# Patient Record
Sex: MALE | Race: WHITE
[De-identification: names, ages, dates, MRNs, and addresses within clinical notes are randomized per-mention and may not be internally consistent; named-entity substitution may affect disease eponyms.]

---

## 2017-01-26 NOTE — RAD
PROCEDURE 

Three-view right shoulder radiographs 01/26/2017 

 

HISTORY 

Severe right shoulder pain. History of recent right shoulder fracture. 

 

FINDINGS 

AP internal and external rotation and transscapular digital radiographs 

the right shoulder were obtained. Comparison study is dated 12/24/2016. 

A comminuted fracture is again seen involving the right humeral neck which

extends to involve the greater tuberosity. The alignment of the fracture 

fragments have not significantly changed. Since the previous examination 

there has been evidence of callus formation consistent with interval 

healing. No acute fracture is seen. 

 

IMPRESSION 

Healing fracture of the proximal right humerus as outlined above. No acute

fracture is seen. 

 

Electronically signed by: Omar Goins MD (Jan 26, 2017 22:54:57)
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
## 2017-03-03 NOTE — ACF
Admit Criteria Forms


                                                                         


                           ABDOMINAL PAIN





Clinical Indications for Admission to Inpatient Care


 


                                                                               

      (Place 'X' for any and all applicable criteria):





Admission is indicated for ANY ONE of the following(1)(2)(3)(4)(5):


[X]I.      Inpatient admission required rather than observation care (Also use 

Abdominal Pain: Observation Care, 


           as appropriate) because of ANY ONE of the following:


            [X]a)   Severe pain requiring acute inpatient management


            [ ]b)    Identification of etiology/finding that requires inpatient 

care (eg, aortic dissection, free air)


            [ ]c)    Absent bowel sounds with complete ileus(6)  


            [ ]d)    Suspected toxic megacolon


            [ ]e)    Severe electrolyte abnormalities requiring inpatient care


            [ ]f)     High fever or infection requiring inpatient admission as 

indicated by ANY ONE of following(7)(8):


                       [ ] i)    Appropriate outpatient or observational care 

antimicrobial treatment unavailable, not effective, or not feasible


                       [ ] ii)   Documented bacteremia 


                       [ ] iii)  Temperature > 104.9 degrees F (oral)


                       [ ] iv)  T >103.1 F (oral) or < 96.8 F(rectal) that does 

not respond to all emergency treatment measures


            [ ]g)     Signs of intestinal obstruction [B] 


            [ ]h)     Hemodynamic instability


            [ ]i)      IV fluid to replace significant ongoing losses (greater 

than 3 L/m2 per day) (12)(13)


            [ ]j)      Percutaneous or open drainage (eg, abscess, biliary tract

) procedures


            [ ]k)     Parenteral nutrition regimen that must be implemented on 

inpatient basis   


            [ ]l)      Other condition,treatment or monitoring requiring 

inpatient admission.


[ ]II.      Peritoneal signs present


[ ]III.     Surgery needed that cannot be performed on an ambulatory basis.


[ ]IV.    Evaluation requires patient to not eat or drink for extended period (

eg, more than 24 hours).





[ ]V.     Contraindications and/or Inappropriate clinical situations for 

Observational Care in patients with


           abdominal pain, when ANY ONE of the following is required:


           [ ]a)  Thorough evaluation is required to prevent catastrophic 

events due to delays in diagnosing  


                   (e.g.Mesenteric ischemia) 1,3


           [ ]b)  Patient with severe pathology or with chronic symptoms 

unlikely to improve in the ED stay (3)


[ ]VI.    General contraindications and/or Inappropriate clinical situations 

for Observational Care in patients


           with abdominal pain, when ANY ONE of the following is required:


           [ ]a)   Prediction of prolongation of LOS based on ANY ONE of the 

following may be considered as 


                    a contraindication for observational care 2, 3, 4, 5, 6, 7, 

8, 9, 10, 11


                    [ ]i)    Age > 65 yrs.


                    [ ]ii)   Patient arriving by ambulance


                    [ ]iii)  Patient with high acuity


                    [ ]iv)  Patient requiring vital sign monitoring


                    [ ]v)   Patient on IV medication


           [ ]b)   Systolic blood pressures  180mmHg 3,12


           [ ]c)   Patient with altered mental status including delirium and 

other alteration of consciousness, (3)


           [ ]d)   Patient whose discharge disposition will be to a skilled 

nursing home or rehabilitation home should 


                    not be managed in Emergency Department Observation Unit. 

CMS rule requires 3 days hospital stay before such placement.3,13


           [ ]e)   Patient with failure to thrive due to broad array of 

etiologies 3,16,17


           [ ]f)    Inability to ambulate 3,14








Extended stay beyond goal length of stay may be needed for(2)(3):


[ ]a)   Persistent abdominal pain with suspected intra-abdominal process


[ ]b)   Diagnosed condition requiring continued stay (e.g., pancreatitis, 

complicated diverticulitis)                                            


[ ]c)   Surgery (e.g., colectomy)                


    





The original [employer] content created by [employer] has been revised. 


The portions of the content which have been revised are identified through the 

use of italic text or in bold, and [facility] 


has neither reviewed nor approved the modified material.All other unmodified 

content is copyright  [employer].





Please see references footnoted in the original [facility] edition 

2016








BERRY MCNEAL Mar 3, 2017 23:34

## 2017-03-03 NOTE — RAD
PROCEDURE 

CT scan abdomen and pelvis with contrast 03/03/2017 

 

HISTORY 

Right flank pain. History of Crohn's disease. 

 

TECHNIQUE 

After the intravenous administration off 75 cc of Omnipaque 300, 

contiguous, 5 millimeter axial sections were obtained through the abdomen 

and pelvis. 

One or more of the following individualized dose reduction techniques were

utilized for this study: 

1. Automated exposure control. 

2. Adjustment of the mA and/or kV according to patient size. 

3. Use of iterative reconstruction technique. 

 

 

FINDINGS 

Comparison study is dated 01/03/2017. 

The absence of oral contrast material limits this study for the detection 

of bowel pathology. 

Images through the lung bases demonstrate minimal dependent subsegmental 

atelectasis bilaterally. 

The liver, spleen, pancreas, adrenal glands and kidneys are within normal 

limits. 

The abdominal aorta tapers normally. The gallbladder is slightly 

contracted. No free fluid or free air is seen within the abdomen. There is

no evidence of bowel obstruction. Air and stool seen throughout the colon.

The terminal ileum is mildly distended and filled with stool. No wall 

thickening of the terminal ileum is noted. No inflammatory changes are 

seen in the adjacent fat. The appendix is well-visualized is within normal

limits. 

Images through the pelvis demonstrate the urinary bladder distended with 

urine. No free fluid is seen. The osseous structures are unchanged. 

 

IMPRESSION 

No acute abnormality is seen. 

 

Electronically signed by: Omar Goisn MD (Mar 03, 2017 21:36:28)

## 2017-03-03 NOTE — PHYS DOC
Past Medical History


Past Medical History:  Diabetes-Type II, Diverticulosis, Pancreatitis


Additional Past Medical Histor:  BI-POLAR, CHRONES DISEASE, ADHD


Past Surgical History:  No Surgical History


Additional Past Surgical Histo:  Colonoscopy, EGD


Additional Information:  


CHEWS TOBACCO


Alcohol Use:  Occasionally


Drug Use:  Marijuana, Methamphetamine





Adult General


Chief Complaint


Chief Complaint:  HYPERGLYCEMIA





HPI


HPI


Patient is a 36  year old female who presents with right flank and abdominal 

pain, nausea and vomiting, hyperglycemia. Patient reports for the past day and 

a half he has been having sharp pain in his right flank and right lower 

quadrant. This is accompanied by nausea and vomiting. He also reports his blood 

sugars been running in the 400s and he has been having polyuria. He has been 

taking his insulin as he is supposed to. No clear inciting or mitigating 

factors. No other acute complaints.





Review of Systems


Review of Systems


Constitutional: Denies fever or chills 


Eyes: Denies change in visual acuity or eye pain 


HENT: Denies nasal congestion or sore throat 


Respiratory: Denies cough or shortness of breath 


Cardiovascular: Denies chest pain


GI: RLQ abdominal pain, nausea, vomiting. Denies bloody stools or diarrhea 


: Polyuria. Denies dysuria or hematuria 


Musculoskeletal: R flank pain. Denies back pain or joint pain 


Integument: Denies rash or skin lesions 


Neurologic: Denies headache, focal weakness or sensory changes





Current Medications


Current Medications





 Current Medications








 Medications


  (Trade)  Dose


 Ordered  Sig/Eden  Start Time


 Stop Time Status Last Admin


Dose Admin


 


 Info


  (Do NOT chart on


 this entry -- for


 MONITORING)  1 each  PRN DAILY  PRN  3/3/17 20:45


 3/5/17 20:44   


 


 


 Iohexol


  (Omnipaque 300


 Mg/ml)  75 ml  1X  ONCE  3/3/17 20:30


 3/3/17 20:31 DC 3/3/17 21:21


75 ML


 


 Morphine Sulfate


 4 mg  4 mg  1X  ONCE  3/3/17 21:45


 3/3/17 21:46 DC 3/3/17 21:59


4 MG


 


 Promethazine HCl/


 Sodium Chloride


  (Phenergan/Iv


 Sodium Chloride


 0.9% 50ml)  50.5 ml @ 


 151.5 mls/


 hr  1X  ONCE  3/3/17 20:15


 3/3/17 20:34 DC 3/3/17 20:33


151.5 MLS/HR


 


 Sodium Chloride


  (Iv Sodium


 Chloride 0.9%


 1000ml Bag)  1,000 ml @ 


 75 mls/hr  [id number]  3/3/17 22:42


 3/4/17 22:41   


 











Allergies


Allergies





 Allergies








Coded Allergies Type Severity Reaction Last Updated Verified


 


  fentanyl Allergy Severe Throat Swells--Anaphylaxis 14 Yes


 


  haloperidol Allergy Severe "In ICU, almost .Toxicity." 14 Yes


 


  lithium Allergy Severe "In ICU-almost . Toxicity" 14 Yes


 


  ketorolac Allergy Intermediate Hives 14 Yes











Physical Exam


Physical Exam


Constitutional: Well developed, well nourished, no acute distress, non-toxic 

appearance 


HENT: Normocephalic, atraumatic, bilateral external ears normal


Eyes: EOMI, conjunctiva normal, no discharge


Neck: Normal range of motion, no stridor


Cardiovascular: Tachycardic, regular rhythm, no murmur 


Lungs & Thorax:  Bilateral breath sounds clear to auscultation


Abdomen: Bowel sounds normal, soft, non-distended, RLQ TTP without guarding or 

rebound


Skin: Warm, dry, no erythema, no rash


Back: R CVA tenderness


Extremities: No obvious deformity, no edema


Neurologic: Alert and oriented X 3, no gross deficits noted


Psychologic: Affect normal, judgement normal, mood normal





Current Patient Data


Vital Signs





 Vital Signs








  Date Time  Temp Pulse Resp B/P Pulse Ox O2 Delivery O2 Flow Rate FiO2


 


3/3/17 19:50 97.6 89 16 130/73 99 Room Air  





 97.6       








Lab Values





 Laboratory Tests








Test


  3/3/17


19:57 3/3/17


20:40


 


Glucose (Fingerstick)


  368mg/dL


(70-99)  H 


 


 


White Blood Count


  


  7.3x10^3/uL


(4.0-11.0)


 


Red Blood Count


  


  4.25x10^6/uL


(4.30-5.70)  L


 


Hemoglobin


  


  11.3g/dL


(13.0-17.5)  L


 


Hematocrit


  


  34.6%


(39.0-53.0)  L


 


Mean Corpuscular Volume  81fL ()  


 


Mean Corpuscular Hemoglobin  27pg (25-35)  


 


Mean Corpuscular Hemoglobin


Concent 


  33g/dL (31-37)


 


 


Red Cell Distribution Width


  


  16.0%


(11.5-14.5)  H


 


Platelet Count


  


  264x10^3/uL


(140-400)


 


Neutrophils (%) (Auto)  72% (31-73)  


 


Lymphocytes (%) (Auto)  20% (24-48)  L


 


Monocytes (%) (Auto)  7% (0-9)  


 


Eosinophils (%) (Auto)  1% (0-3)  


 


Basophils (%) (Auto)  0% (0-3)  


 


Neutrophils # (Auto)


  


  5.2x10^3uL


(1.8-7.7)


 


Lymphocytes # (Auto)


  


  1.5x10^3/uL


(1.0-4.8)


 


Monocytes # (Auto)


  


  0.5x10^3/uL


(0.0-1.1)


 


Eosinophils # (Auto)


  


  0.1x10^3/uL


(0.0-0.7)


 


Basophils # (Auto)


  


  0.0x10^3/uL


(0.0-0.2)


 


Urine Color  Yellow  


 


Urine Clarity  Cloudy  


 


Urine pH  6.5  


 


Urine Specific Gravity  >=1.030  


 


Urine Protein


  


  Negativemg/dL


(NEG-TRACE)


 


Urine Glucose (UA)


  


  >=1000mg/dL


(NEG)


 


Urine Ketones (Stick)


  


  Negativemg/dL


(NEG)


 


Urine Blood


  


  Negative (NEG)


 


 


Urine Nitrite


  


  Negative (NEG)


 


 


Urine Bilirubin


  


  Negative (NEG)


 


 


Urine Urobilinogen Dipstick


  


  0.2mg/dL (0.2


mg/dL)


 


Urine Leukocyte Esterase


  


  Negative (NEG)


 


 


Urine RBC  0/HPF (0-2)  


 


Urine WBC  0/HPF (0-4)  


 


Urine Squamous Epithelial


Cells 


  Occ/LPF  


 


 


Urine Bacteria  0/HPF (0-FEW)  


 


Sodium Level


  


  140mmol/L


(136-145)


 


Potassium Level


  


  4.0mmol/L


(3.5-5.1)


 


Chloride Level


  


  101mmol/L


()


 


Carbon Dioxide Level


  


  26mmol/L


(21-32)


 


Anion Gap  13 (6-14)  


 


Blood Urea Nitrogen


  


  12mg/dL (8-26)


 


 


Creatinine


  


  0.8mg/dL


(0.7-1.3)


 


Estimated GFR


(Cockcroft-Gault) 


  110.0  


 


 


BUN/Creatinine Ratio  15 (6-20)  


 


Glucose Level


  


  356mg/dL


(70-99)  H


 


Calcium Level


  


  9.0mg/dL


(8.5-10.1)


 


Total Bilirubin


  


  0.2mg/dL


(0.2-1.0)


 


Aspartate Amino Transferase


(AST) 


  14U/L (15-37)


L


 


Alanine Aminotransferase (ALT)  24U/L (16-63)  


 


Alkaline Phosphatase


  


  137U/L


()  H


 


Total Protein


  


  7.3g/dL


(6.4-8.2)


 


Albumin


  


  3.5g/dL


(3.4-5.0)


 


Albumin/Globulin Ratio


  


  0.9 (1.0-1.7)


L


 


Lipase


  


  123U/L


()





 Laboratory Tests


3/3/17 20:40








 Laboratory Tests


3/3/17 20:40














EKG


EKG


[]





Radiology/Procedures


Radiology/Procedures


CT A/P:


IMPRESSION 


No acute abnormality is seen.





Course & Med Decision Making


Course & Med Decision Making


Pertinent Labs and Imaging studies reviewed. (See chart for details)


Patient is 36-year-old male who presents with right flank and right lower 

quadrant pain, nausea and vomiting, hyperglycemia. We'll check CT abdomen/

pelvis to evaluate for possible causes such as kidney stone or appendicitis. 

Also check labs, UA. IV fluids, pain medication, nausea medication ordered for 

relief of symptoms. Blood work notable only for hyperglycemia. Imaging results 

as above. Discussed results with patient, who remains markedly tachycardic (in 

the 130s) even after fluids. Discussed with Dr. Borjas, will admit under his 

care for further evaluation and treatment.





Dragon Disclaimer


Dragon Disclaimer


This electronic medical record was generated, in whole or in part, using a 

voice recognition dictation system.





Departure


Departure


Impression:  


 Primary Impression:  


 Abdominal pain


 Additional Impression:  


 Tachycardia


Disposition:   ADMITTED AS INPATIENT


Admitting Physician:  Suni Borjas


Condition:  STABLE


Referrals:  


NO PCP (PCP)





Problem Qualifiers








ALDAIR JOHNSON MD Mar 3, 2017 20:12

## 2017-03-04 NOTE — SSS
ADMIT DATE:  03/03/2017



CHIEF COMPLAINT:  Hyperglycemia.



HISTORY OF PRESENT ILLNESS:  The patient is a pleasant 36-year-old male who

presents to the ER with right flank pain and elevated glucose.  While in the ER,

he was noted to be tachycardic at 114 beats per minute.  I discussed the case

with the ER physician.  He also has sugars in the 400s.  We are going to admit

him to get his sugars under control.



PAST MEDICAL HISTORY:  Narcotic dependence, diabetes, diverticulosis,

pancreatitis, bipolar, Crohn's, ADHD, colonoscopy, EGD, chewing tobacco usage,

marijuana use, methamphetamine use.



ALLERGIES:  FENTANYL, HALOPERIDOL, TORADOL AND LITHIUM.



FAMILY HISTORY:  Coronary artery disease.



SOCIAL HISTORY:  He does not drink or smoke.  I think he does use some drugs.



MEDICATIONS:  Reviewed, please refer to the MRAD.



REVIEW OF SYSTEMS:  

GENERAL:  No history of weight change, weakness or fevers.

SKIN:  No bruising, hair changes or rashes.

EYES:  No blurred, double or loss of vision.

NOSE AND THROAT:  No history of nosebleeds, hoarseness or sore throat.

HEART:  No history of palpitations, chest pain or shortness of breath on

exertion.

LUNGS:  Denies cough, hemoptysis, wheezing or shortness of breath.

GASTROINTESTINAL:  Denies changes in appetite, nausea, vomiting, diarrhea or

constipation.

GENITOURINARY:  No history of frequency, urgency, hesitancy or nocturia.

NEUROLOGIC:  Denies history of numbness, tingling, tremor or weakness.

PSYCHIATRIC:  No history of panic, anxiety or depression.

ENDOCRINE:  No history of heat or cold intolerance, polyuria or polydipsia.

EXTREMITIES:  Denies muscle weakness, joint pain, pain on walking or stiffness.



PHYSICAL EXAMINATION:

VITAL SIGNS:  Temperature afebrile, pulse is ranging from 118-120, respirations

18, blood pressure 149/102.

GENERAL:  He is alert, cooperative, pleasant.

HEART:  Normal S1, S2, tachycardic.

LUNGS:  Clear.

ABDOMEN:  Soft.

EXTREMITIES:  No edema.

SKIN:  No rashes.

PSYCHIATRIC:  He is stable.

VASCULAR:  Good capillary refill.

ENDOCRINE:  No thyromegaly.

LYMPHATICS:  No cervical nodes.

HEMATOPOIETIC:  No bruising.



LABORATORY DATA:  White count 7, hemoglobin 11, platelets 264.  Electrolytes

normal other than a glucose of 356.  This morning it is down to 200.  AST a

little low at 14, alkaline phosphatase a little high at 137.



ASSESSMENT AND PLAN:  Hyperglycemia tachycardia, elevated alkaline phosphate and

anemia in a middle-aged male who has the above-noted comorbidities.  The patient

is on cardiac monitoring.  We will consult GI regarding his abdominal pain. 

Consult Cardiology regarding the tachycardia.  Check a urine drug screen.  PT,

OT, home meds, frequent Accu-Cheks, sliding scale insulin.

 



______________________________

RACHAEL CHAVIRA DO



DR:  [email or web address]/[email or web address]  JOB#:  [id number] / [id number]

DD:  03/04/2017 11:16  DT:  03/04/2017 12:22

## 2017-03-05 NOTE — PDOC2
CONSULT


Date of Consult


Date of Consult


DATE: 3/5/17 


TIME: 08:32





Reason for Consult


Reason for Consult:


tachycardia





Referring Physician


Referring Physician:


Dr. Borjas





Identification/Chief Complaint


Chief Complaint


Abdominal pain





Source


Source:  Patient





History of Present Illness


Reason for Visit:


The patient is a 36-year-old male admitted through the emergency room with 

episodes of abdominal discomfort and elevated glucose. He has a history of 

diverticulitis, pancreatitis and Crohn's disease. Initial glucose levels were 

in the 200 range. He has been treated with medical patient's and pain control 

and is feeling significantly better this morning. His initial rate on admission 

was approximately 134 and a sinus tachycardia by available telemetry strips. 

This morning he is running in a sinus tachycardia rate of 100. There is no 

documented history of coronary disease, congestive heart failure or significant 

cardiac arrhythmias.





Past Medical History


Cardiovascular:  HTN


GI:  Inflam bowel disease, Irritable bowel disease


Heme/Onc:  No pertinent hx


Hepatobiliary:  No pertinent hx


Psych:  Bipolar, Depression


Musculoskeletal:   low back pain, Osteoarthritis


Rheumatologic:  No pertinent hx


Infectious disease:  No pertinent hx


Renal/:  No pertinent hx


Endocrine:  Diabetes





Past Surgical History


Past Surgical History:  No pertinent history





Family History


Family History:  No Significant, Hypertension





Social History


ALCOHOL:  occassional


Drugs:  None, Marijuana





Current Problem List


Problem List


 Problems


Medical Problems:


(1) Abdominal pain


Status: Acute  





(2) Tachycardia


Status: Acute  











Current Medications


Current Medications





 Current Medications


Sodium Chloride 1,000 ml @  1,000 mls/hr Q1H IV  Last administered on 3/3/17at 

20:32;  Start 3/3/17 at 20:12;  Stop 3/3/17 at 21:11;  Status DC


Promethazine HCl/ Sodium Chloride (Phenergan/Iv Sodium Chloride 0.9% 50ml) 50.5 

ml @  151.5 mls/ hr 1X  ONCE IV  Last administered on 3/3/17at 20:33;  Start 3/3

/17 at 20:15;  Stop 3/3/17 at 20:34;  Status DC


Morphine Sulfate 4 mg 1X  ONCE IV  Last administered on 3/3/17at 20:32;  Start 3

/3/17 at 20:15;  Stop 3/3/17 at 20:16;  Status DC


Iohexol (Omnipaque 300 Mg/ml) 75 ml 1X  ONCE IV  Last administered on 3/3/17at 

21:21;  Start 3/3/17 at 20:30;  Stop 3/3/17 at 20:31;  Status DC


Info (Do NOT chart on this entry -- for MONITORING) 1 each PRN DAILY  PRN MC 

SEE COMMENTS;  Start 3/3/17 at 20:45;  Stop 3/5/17 at 20:44


Morphine Sulfate 4 mg 4 mg 1X  ONCE IV  Last administered on 3/3/17at 21:59;  

Start 3/3/17 at 21:45;  Stop 3/3/17 at 21:46;  Status DC


Sodium Chloride (Iv Sodium Chloride 0.9% 1000ml Bag) 1,000 ml @  1,000 mls/hr 

1X  ONCE IV  Last administered on 3/3/17at 22:45;  Start 3/3/17 at 22:45;  Stop 

3/3/17 at 23:44;  Status DC


Ondansetron HCl (Zofran) 4 mg PRN Q8HRS  PRN IV NAUSEA/VOMITING Last 

administered on 3/4/17at 13:30;  Start 3/3/17 at 22:45;  Stop 3/4/17 at 22:44;  

Status DC


Morphine Sulfate 4 mg 4 mg PRN Q2HR  PRN IV SEVERE PAIN Last administered on 3/4

/17at 21:01;  Start 3/3/17 at 22:45;  Stop 3/4/17 at 22:44;  Status DC


Sodium Chloride (Iv Sodium Chloride 0.9% 1000ml Bag) 1,000 ml @  75 mls/hr 

[id number] IV  Last administered on 3/4/17at 11:56;  Start 3/3/17 at 22:42;  Stop 3

/4/17 at 22:41;  Status DC


Acetaminophen (Tylenol) 650 mg PRN Q4HRS  PRN PO FEVER;  Start 3/3/17 at 22:45;

  Stop 3/4/17 at 22:44;  Status DC


Clonazepam (Klonopin) 1 mg TID PO  Last administered on 3/4/17at 21:01;  Start 3

/4/17 at 14:00


Insulin Aspart (Novolog) 20 units TIDAC SQ  Last administered on 3/4/17at 18:30

;  Start 3/4/17 at 11:30


Non-Formulary Medication 30 mg BID PO ADHD;  Start 3/4/17 at 21:00;  Status UNV


Gabapentin (Neurontin) 600 mg TID PO  Last administered on 3/4/17at 21:01;  

Start 3/4/17 at 14:00


Insulin Detemir (Levemir) 80 units QHS SQ  Last administered on 3/4/17at 21:07;

  Start 3/4/17 at 21:00


Oxycodone HCl (Roxicodone) 10 mg PRN Q4HRS  PRN PO PAIN Last administered on 3/5

/17at 03:52;  Start 3/4/17 at 11:30





Active Scripts


Active


Reported


Oxycodone Hcl 10 Mg Tablet 1 Tab PO Q4-6HRS PRN


Novolog Flexpen (Insulin Aspart) 100 Unit/1 Ml Insuln.pen 20 Unit SQ TIDAC


Gabapentin 600 Mg Tablet 600 Mg PO TID


Lantus Solostar (Insulin Glargine,Hum.rec.anlog) 100 Unit/1 Ml Insuln.pen 80 

Unit SQ QHS


Clonazepam 1 Mg Tablet 1 Tab PO TID


Adderall 30 Mg Tablet (Dextroamphetamine/Amphetamine) 30 Mg Tablet 30 Mg PO BID


Metformin Hcl 1,000 Mg Tablet 1,000 Mg PO BID





Allergies


Allergies:  


Coded Allergies:  


     fentanyl (Verified  Allergy, Severe, Throat Swells--Anaphylaxis, 14)


     haloperidol (Verified  Allergy, Severe, "In ICU, almost .Toxicity.", )


     lithium (Verified  Allergy, Severe, "In ICU-almost . Toxicity", 14

)


     ketorolac (Verified  Allergy, Intermediate, Hives, 14)





ROS


Gastrointestinal:  Yes Abdominal Pain, Yes Nausea, Yes Vomiting





Physical Exam


General:  mild distress


HEENT:  Atraumatic


Lungs:  Clear to auscultation


Heart:  Regular rate


Abdomen:  Other (mild generalized tenderness)





Vitals


VITALS





 Vital Signs








  Date Time  Temp Pulse Resp B/P Pulse Ox O2 Delivery O2 Flow Rate FiO2


 


3/5/17 07:00 97.9 107 18 126/73 94 Room Air  





 97.9       











Labs


Labs





Laboratory Tests








Test


  3/3/17


19:57 3/3/17


20:40 3/4/17


05:05 3/4/17


07:30


 


Glucose (Fingerstick)


  368mg/dL


(70-99) 


  


  200mg/dL


(70-99)


 


White Blood Count


  


  7.3x10^3/uL


(4.0-11.0) 6.2x10^3/uL


(4.0-11.0) 


 


 


Red Blood Count


  


  4.25x10^6/uL


(4.30-5.70) 4.18x10^6/uL


(4.30-5.70) 


 


 


Hemoglobin


  


  11.3g/dL


(13.0-17.5) 11.0g/dL


(13.0-17.5) 


 


 


Hematocrit


  


  34.6%


(39.0-53.0) 33.6%


(39.0-53.0) 


 


 


Mean Corpuscular Volume  81fL ()  80fL ()  


 


Mean Corpuscular Hemoglobin  27pg (25-35)  26pg (25-35)  


 


Mean Corpuscular Hemoglobin


Concent 


  33g/dL (31-37) 


  33g/dL (31-37) 


  


 


 


Red Cell Distribution Width


  


  16.0%


(11.5-14.5) 15.8%


(11.5-14.5) 


 


 


Platelet Count


  


  264x10^3/uL


(140-400) 263x10^3/uL


(140-400) 


 


 


Neutrophils (%) (Auto)  72% (31-73)  73% (31-73)  


 


Lymphocytes (%) (Auto)  20% (24-48)  18% (24-48)  


 


Monocytes (%) (Auto)  7% (0-9)  7% (0-9)  


 


Eosinophils (%) (Auto)  1% (0-3)  2% (0-3)  


 


Basophils (%) (Auto)  0% (0-3)  0% (0-3)  


 


Neutrophils # (Auto)


  


  5.2x10^3uL


(1.8-7.7) 4.5x10^3uL


(1.8-7.7) 


 


 


Lymphocytes # (Auto)


  


  1.5x10^3/uL


(1.0-4.8) 1.1x10^3/uL


(1.0-4.8) 


 


 


Monocytes # (Auto)


  


  0.5x10^3/uL


(0.0-1.1) 0.4x10^3/uL


(0.0-1.1) 


 


 


Eosinophils # (Auto)


  


  0.1x10^3/uL


(0.0-0.7) 0.1x10^3/uL


(0.0-0.7) 


 


 


Basophils # (Auto)


  


  0.0x10^3/uL


(0.0-0.2) 0.0x10^3/uL


(0.0-0.2) 


 


 


Urine Color  Yellow   


 


Urine Clarity  Cloudy   


 


Urine pH  6.5   


 


Urine Specific Gravity  >=1.030   


 


Urine Protein


  


  Negativemg/dL


(NEG-TRACE) 


  


 


 


Urine Glucose (UA)


  


  >=1000mg/dL


(NEG) 


  


 


 


Urine Ketones (Stick)


  


  Negativemg/dL


(NEG) 


  


 


 


Urine Blood  Negative (NEG)   


 


Urine Nitrite  Negative (NEG)   


 


Urine Bilirubin  Negative (NEG)   


 


Urine Urobilinogen Dipstick


  


  0.2mg/dL (0.2


mg/dL) 


  


 


 


Urine Leukocyte Esterase  Negative (NEG)   


 


Urine RBC  0/HPF (0-2)   


 


Urine WBC  0/HPF (0-4)   


 


Urine Squamous Epithelial


Cells 


  Occ/LPF 


  


  


 


 


Urine Bacteria  0/HPF (0-FEW)   


 


Sodium Level


  


  140mmol/L


(136-145) 140mmol/L


(136-145) 


 


 


Potassium Level


  


  4.0mmol/L


(3.5-5.1) 4.0mmol/L


(3.5-5.1) 


 


 


Chloride Level


  


  101mmol/L


() 104mmol/L


() 


 


 


Carbon Dioxide Level


  


  26mmol/L


(21-32) 27mmol/L


(21-32) 


 


 


Anion Gap  13 (6-14)  9 (6-14)  


 


Blood Urea Nitrogen  12mg/dL (8-26)  9mg/dL (8-26)  


 


Creatinine


  


  0.8mg/dL


(0.7-1.3) 0.6mg/dL


(0.7-1.3) 


 


 


Estimated GFR


(Cockcroft-Gault) 


  110.0 


  153.3 


  


 


 


BUN/Creatinine Ratio  15 (6-20)   


 


Glucose Level


  


  356mg/dL


(70-99) 243mg/dL


(70-99) 


 


 


Calcium Level


  


  9.0mg/dL


(8.5-10.1) 8.8mg/dL


(8.5-10.1) 


 


 


Total Bilirubin


  


  0.2mg/dL


(0.2-1.0) 


  


 


 


Aspartate Amino Transf


(AST/SGOT) 


  14U/L (15-37) 


  


  


 


 


Alanine Aminotransferase


(ALT/SGPT) 


  24U/L (16-63) 


  


  


 


 


Alkaline Phosphatase


  


  137U/L


() 


  


 


 


Total Protein


  


  7.3g/dL


(6.4-8.2) 


  


 


 


Albumin


  


  3.5g/dL


(3.4-5.0) 


  


 


 


Albumin/Globulin Ratio  0.9 (1.0-1.7)   


 


Lipase


  


  123U/L


() 


  


 














Test


  3/4/17


11:20 3/4/17


16:57 3/4/17


21:00 3/5/17


07:19


 


Glucose (Fingerstick)


  243mg/dL


(70-99) 247mg/dL


(70-99) 182mg/dL


(70-99) 288mg/dL


(70-99)








Laboratory Tests








Test


  3/4/17


11:20 3/4/17


16:57 3/4/17


21:00 3/5/17


07:19


 


Glucose (Fingerstick)


  243mg/dL


(70-99) 247mg/dL


(70-99) 182mg/dL


(70-99) 288mg/dL


(70-99)











Assessment/Plan


Assessment/Plan


1. Abdominal discomfort. Patient has a history of Crohn's disease and 

pancreatitis. He is feeling better. Workup is ongoing.





2. Tachycardia. Patient's initial telemetry strip shows a sinus tachycardia 

which is appropriate in the setting of his abdominal pain. With better control 

of his pain he is now in a sinus tachycardia rate approximately 100. We'll 

continue to monitor. We'll check an echocardiogram to rule out wall motion 

abnormalities but overall this appears to be an appropriate tachycardia 

secondary to his pain.





3. History of bipolar disorder. Would continue on medical treatment.





4. Diabetes. Improved control. Medications adjustment as per the primary 

service.





Thank you for allowing to participate in the care of your patient.








AMADOU DEAL MD Mar 5, 2017 08:38

## 2017-03-05 NOTE — PDOC
PROGRESS NOTES


Chief Complaint


Chief Complaint


CC - Hyperglycemia and Tachycardia





- H/o Narcotics dependence


- Diabetes


- Diverticulosis


- ADHD


- H/o Crohn's disease





History of Present Illness


History of Present Illness


Patient was sitting on the side of the bed at the time of evaluation, was in no 

acute distress, had some questions which answered accordingly. Plan of care 

discussed with pt. and RN.





Vitals


Vitals





 Vital Signs








  Date Time  Temp Pulse Resp B/P Pulse Ox O2 Delivery O2 Flow Rate FiO2


 


3/5/17 12:57   20  97 Room Air  


 


3/5/17 10:47 97.9 116  137/85    





 97.9       











Physical Exam


General:  Alert, Oriented X3, Cooperative, No acute distress


Heart:  Regular rate


Lungs:  Clear


Abdomen:  Soft


Extremities:  No edema, Normal pulses, No tenderness/swelling


Skin:  No breakdown, Other (cellulitis on left arm)





Labs


LABS





Laboratory Tests








Test


  3/4/17


16:57 3/4/17


21:00 3/5/17


07:19


 


Glucose (Fingerstick)


  247mg/dL


(70-99) 182mg/dL


(70-99) 288mg/dL


(70-99)











Review of Systems


Review of Systems


Afebrile, denies nausea/vomiting, left arm erythema and swelling noticed, no SOB

, no CP,





Assessment and Plan


Assessmemt and Plan


CC - Hyperglycemia and Tachycardia





Assessment:


- H/o Narcotics dependence


- Diabetes


- Diverticulosis


- ADHD


- H/o Crohn's disease





Plan:


- Continue care per floor protocol


- Ordered Ceftriaxone 1g IV Q24h for cellulitis on left arm


- Ordered Morphin Sulphate 2mg IV Q2H prn for pain


- Recheck labs in AM


- Appreciate subspecialities inputs and recommendations  








 Problems


Medical Problems:


(1) Abdominal pain


Status: Acute  





(2) Tachycardia


Status: Acute  





Problems:  





Comment


Review of Relevant


I have reviewed the following items keegan (where applicable) has been applied.


Labs





Laboratory Tests








Test


  3/3/17


19:57 3/3/17


20:40 3/4/17


05:05 3/4/17


07:30


 


Glucose (Fingerstick)


  368mg/dL


(70-99) 


  


  200mg/dL


(70-99)


 


White Blood Count


  


  7.3x10^3/uL


(4.0-11.0) 6.2x10^3/uL


(4.0-11.0) 


 


 


Red Blood Count


  


  4.25x10^6/uL


(4.30-5.70) 4.18x10^6/uL


(4.30-5.70) 


 


 


Hemoglobin


  


  11.3g/dL


(13.0-17.5) 11.0g/dL


(13.0-17.5) 


 


 


Hematocrit


  


  34.6%


(39.0-53.0) 33.6%


(39.0-53.0) 


 


 


Mean Corpuscular Volume  81fL ()  80fL ()  


 


Mean Corpuscular Hemoglobin  27pg (25-35)  26pg (25-35)  


 


Mean Corpuscular Hemoglobin


Concent 


  33g/dL (31-37) 


  33g/dL (31-37) 


  


 


 


Red Cell Distribution Width


  


  16.0%


(11.5-14.5) 15.8%


(11.5-14.5) 


 


 


Platelet Count


  


  264x10^3/uL


(140-400) 263x10^3/uL


(140-400) 


 


 


Neutrophils (%) (Auto)  72% (31-73)  73% (31-73)  


 


Lymphocytes (%) (Auto)  20% (24-48)  18% (24-48)  


 


Monocytes (%) (Auto)  7% (0-9)  7% (0-9)  


 


Eosinophils (%) (Auto)  1% (0-3)  2% (0-3)  


 


Basophils (%) (Auto)  0% (0-3)  0% (0-3)  


 


Neutrophils # (Auto)


  


  5.2x10^3uL


(1.8-7.7) 4.5x10^3uL


(1.8-7.7) 


 


 


Lymphocytes # (Auto)


  


  1.5x10^3/uL


(1.0-4.8) 1.1x10^3/uL


(1.0-4.8) 


 


 


Monocytes # (Auto)


  


  0.5x10^3/uL


(0.0-1.1) 0.4x10^3/uL


(0.0-1.1) 


 


 


Eosinophils # (Auto)


  


  0.1x10^3/uL


(0.0-0.7) 0.1x10^3/uL


(0.0-0.7) 


 


 


Basophils # (Auto)


  


  0.0x10^3/uL


(0.0-0.2) 0.0x10^3/uL


(0.0-0.2) 


 


 


Urine Color  Yellow   


 


Urine Clarity  Cloudy   


 


Urine pH  6.5   


 


Urine Specific Gravity  >=1.030   


 


Urine Protein


  


  Negativemg/dL


(NEG-TRACE) 


  


 


 


Urine Glucose (UA)


  


  >=1000mg/dL


(NEG) 


  


 


 


Urine Ketones (Stick)


  


  Negativemg/dL


(NEG) 


  


 


 


Urine Blood  Negative (NEG)   


 


Urine Nitrite  Negative (NEG)   


 


Urine Bilirubin  Negative (NEG)   


 


Urine Urobilinogen Dipstick


  


  0.2mg/dL (0.2


mg/dL) 


  


 


 


Urine Leukocyte Esterase  Negative (NEG)   


 


Urine RBC  0/HPF (0-2)   


 


Urine WBC  0/HPF (0-4)   


 


Urine Squamous Epithelial


Cells 


  Occ/LPF 


  


  


 


 


Urine Bacteria  0/HPF (0-FEW)   


 


Sodium Level


  


  140mmol/L


(136-145) 140mmol/L


(136-145) 


 


 


Potassium Level


  


  4.0mmol/L


(3.5-5.1) 4.0mmol/L


(3.5-5.1) 


 


 


Chloride Level


  


  101mmol/L


() 104mmol/L


() 


 


 


Carbon Dioxide Level


  


  26mmol/L


(21-32) 27mmol/L


(21-32) 


 


 


Anion Gap  13 (6-14)  9 (6-14)  


 


Blood Urea Nitrogen  12mg/dL (8-26)  9mg/dL (8-26)  


 


Creatinine


  


  0.8mg/dL


(0.7-1.3) 0.6mg/dL


(0.7-1.3) 


 


 


Estimated GFR


(Cockcroft-Gault) 


  110.0 


  153.3 


  


 


 


BUN/Creatinine Ratio  15 (6-20)   


 


Glucose Level


  


  356mg/dL


(70-99) 243mg/dL


(70-99) 


 


 


Calcium Level


  


  9.0mg/dL


(8.5-10.1) 8.8mg/dL


(8.5-10.1) 


 


 


Total Bilirubin


  


  0.2mg/dL


(0.2-1.0) 


  


 


 


Aspartate Amino Transf


(AST/SGOT) 


  14U/L (15-37) 


  


  


 


 


Alanine Aminotransferase


(ALT/SGPT) 


  24U/L (16-63) 


  


  


 


 


Alkaline Phosphatase


  


  137U/L


() 


  


 


 


Total Protein


  


  7.3g/dL


(6.4-8.2) 


  


 


 


Albumin


  


  3.5g/dL


(3.4-5.0) 


  


 


 


Albumin/Globulin Ratio  0.9 (1.0-1.7)   


 


Lipase


  


  123U/L


() 


  


 














Test


  3/4/17


11:20 3/4/17


16:57 3/4/17


21:00 3/5/17


07:19


 


Glucose (Fingerstick)


  243mg/dL


(70-99) 247mg/dL


(70-99) 182mg/dL


(70-99) 288mg/dL


(70-99)








Laboratory Tests








Test


  3/4/17


16:57 3/4/17


21:00 3/5/17


07:19


 


Glucose (Fingerstick)


  247mg/dL


(70-99) 182mg/dL


(70-99) 288mg/dL


(70-99)








Medications





 Current Medications


Sodium Chloride 1,000 ml @  1,000 mls/hr Q1H IV  Last administered on 3/3/17at 

20:32;  Start 3/3/17 at 20:12;  Stop 3/3/17 at 21:11;  Status DC


Promethazine HCl/ Sodium Chloride (Phenergan/Iv Sodium Chloride 0.9% 50ml) 50.5 

ml @  151.5 mls/ hr 1X  ONCE IV  Last administered on 3/3/17at 20:33;  Start 3/3

/17 at 20:15;  Stop 3/3/17 at 20:34;  Status DC


Morphine Sulfate 4 mg 1X  ONCE IV  Last administered on 3/3/17at 20:32;  Start 3

/3/17 at 20:15;  Stop 3/3/17 at 20:16;  Status DC


Iohexol (Omnipaque 300 Mg/ml) 75 ml 1X  ONCE IV  Last administered on 3/3/17at 

21:21;  Start 3/3/17 at 20:30;  Stop 3/3/17 at 20:31;  Status DC


Info (Do NOT chart on this entry -- for MONITORING) 1 each PRN DAILY  PRN MC 

SEE COMMENTS;  Start 3/3/17 at 20:45;  Stop 3/5/17 at 20:44


Morphine Sulfate 4 mg 4 mg 1X  ONCE IV  Last administered on 3/3/17at 21:59;  

Start 3/3/17 at 21:45;  Stop 3/3/17 at 21:46;  Status DC


Sodium Chloride (Iv Sodium Chloride 0.9% 1000ml Bag) 1,000 ml @  1,000 mls/hr 

1X  ONCE IV  Last administered on 3/3/17at 22:45;  Start 3/3/17 at 22:45;  Stop 

3/3/17 at 23:44;  Status DC


Ondansetron HCl (Zofran) 4 mg PRN Q8HRS  PRN IV NAUSEA/VOMITING Last 

administered on 3/4/17at 13:30;  Start 3/3/17 at 22:45;  Stop 3/4/17 at 22:44;  

Status DC


Morphine Sulfate 4 mg 4 mg PRN Q2HR  PRN IV SEVERE PAIN Last administered on 3/4

/17at 21:01;  Start 3/3/17 at 22:45;  Stop 3/4/17 at 22:44;  Status DC


Sodium Chloride (Iv Sodium Chloride 0.9% 1000ml Bag) 1,000 ml @  75 mls/hr 

[id number] IV  Last administered on 3/4/17at 11:56;  Start 3/3/17 at 22:42;  Stop 3

/4/17 at 22:41;  Status DC


Acetaminophen (Tylenol) 650 mg PRN Q4HRS  PRN PO FEVER;  Start 3/3/17 at 22:45;

  Stop 3/4/17 at 22:44;  Status DC


Clonazepam (Klonopin) 1 mg TID PO  Last administered on 3/5/17at 08:44;  Start 3

/4/17 at 14:00


Insulin Aspart (Novolog) 20 units TIDAC SQ  Last administered on 3/5/17at 12:35

;  Start 3/4/17 at 11:30


Non-Formulary Medication 30 mg BID PO ADHD;  Start 3/4/17 at 21:00;  Status UNV


Gabapentin (Neurontin) 600 mg TID PO  Last administered on 3/5/17at 08:45;  

Start 3/4/17 at 14:00


Insulin Detemir (Levemir) 80 units QHS SQ  Last administered on 3/4/17at 21:07;

  Start 3/4/17 at 21:00


Oxycodone HCl (Roxicodone) 10 mg PRN Q4HRS  PRN PO PAIN Last administered on 3/5

/17at 08:46;  Start 3/4/17 at 11:30


Morphine Sulfate 4 mg PRN Q2HR  PRN IV PAIN Last administered on 3/5/17at 12:27

;  Start 3/5/17 at 11:15





Active Scripts


Active


Reported


Oxycodone Hcl 10 Mg Tablet 1 Tab PO Q4-6HRS PRN


Novolog Flexpen (Insulin Aspart) 100 Unit/1 Ml Insuln.pen 20 Unit SQ TIDAC


Gabapentin 600 Mg Tablet 600 Mg PO TID


Lantus Solostar (Insulin Glargine,Hum.rec.anlog) 100 Unit/1 Ml Insuln.pen 80 

Unit SQ QHS


Clonazepam 1 Mg Tablet 1 Tab PO TID


Adderall 30 Mg Tablet (Dextroamphetamine/Amphetamine) 30 Mg Tablet 30 Mg PO BID


Metformin Hcl 1,000 Mg Tablet 1,000 Mg PO BID


Vitals/I & O





 Vital Sign - Last 24 Hours








 3/4/17 3/4/17 3/4/17 3/4/17





 15:13 15:22 15:54 18:43


 


Temp  97.7  





  97.7  


 


Pulse  96  


 


Resp 20 23 20 20


 


B/P  138/82  


 


Pulse Ox 96 95 95 95


 


O2 Delivery Room Air Room Air Room Air Room Air


 


    





    





 3/4/17 3/4/17 3/4/17 3/4/17





 19:00 19:13 20:00 23:00


 


Temp 97.7   97.7





 97.7   97.7


 


Pulse 123   122


 


Resp 20 20  20


 


B/P 134/86   128/81


 


Pulse Ox 98 95  96


 


O2 Delivery Room Air Room Air Room Air Room Air


 


    





    





 3/5/17 3/5/17 3/5/17 3/5/17





 03:00 07:00 08:46 09:46


 


Temp 97.5 97.9  





 97.5 97.9  


 


Pulse 112 107  


 


Resp 20 18 20 20


 


B/P 153/78 126/73  


 


Pulse Ox 96 94 94 94


 


O2 Delivery Room Air Room Air Room Air Room Air


 


    





    





 3/5/17 3/5/17 3/5/17 





 10:47 12:27 12:57 


 


Temp 97.9   





 97.9   


 


Pulse 116   


 


Resp 18 20 20 


 


B/P 137/85   


 


Pulse Ox 97 97 97 


 


O2 Delivery Room Air Room Air Room Air 














 Intake and Output   


 


 3/4/17 3/4/17 3/5/17





 15:00 23:00 07:00


 


Intake Total 600 ml 480 ml 400 ml


 


Output Total 400 ml  


 


Balance 200 ml 480 ml 400 ml














RACHAEL CHAVIRA III DO Mar 5, 2017 13:40

## 2017-03-06 NOTE — PDOC
CARDIO Progress Notes


Date and Time


Date of Service


3/6/17


Time of Evaluation


1100





Subjective


Subjective:  No Chest Pain, No shortness of breath, No Palpitations, Other (RLQ 

pain)





Vitals


Vitals





 Vital Signs








  Date Time  Temp Pulse Resp B/P Pulse Ox O2 Delivery O2 Flow Rate FiO2


 


3/6/17 12:31     99 Room Air  


 


3/6/17 10:49 97.7 112 18 127/75    





 97.7       








Weight


Weight [ ]





Input and Output


Intake and Output











 Intake and Output 


 


 3/6/17





 07:00


 


Intake Total 1680 ml


 


Balance 1680 ml


 


 


 


Intake Oral 1680 ml


 


# Voids 6











Laboratory


Labs





Laboratory Tests








Test


  3/5/17


16:43 3/5/17


21:11 3/6/17


05:45 3/6/17


06:51


 


Glucose (Fingerstick)


  180mg/dL


(70-99) 243mg/dL


(70-99) 


  113mg/dL


(70-99)


 


White Blood Count


  


  


  7.3x10^3/uL


(4.0-11.0) 


 


 


Red Blood Count


  


  


  4.65x10^6/uL


(4.30-5.70) 


 


 


Hemoglobin


  


  


  12.2g/dL


(13.0-17.5) 


 


 


Hematocrit


  


  


  36.8%


(39.0-53.0) 


 


 


Mean Corpuscular Volume   79fL ()  


 


Mean Corpuscular Hemoglobin   26pg (25-35)  


 


Mean Corpuscular Hemoglobin


Concent 


  


  33g/dL (31-37) 


  


 


 


Red Cell Distribution Width


  


  


  16.1%


(11.5-14.5) 


 


 


Platelet Count


  


  


  276x10^3/uL


(140-400) 


 


 


Neutrophils (%) (Auto)   72% (31-73)  


 


Lymphocytes (%) (Auto)   16% (24-48)  


 


Monocytes (%) (Auto)   9% (0-9)  


 


Eosinophils (%) (Auto)   2% (0-3)  


 


Basophils (%) (Auto)   0% (0-3)  


 


Neutrophils # (Auto)


  


  


  5.3x10^3uL


(1.8-7.7) 


 


 


Lymphocytes # (Auto)


  


  


  1.2x10^3/uL


(1.0-4.8) 


 


 


Monocytes # (Auto)


  


  


  0.7x10^3/uL


(0.0-1.1) 


 


 


Eosinophils # (Auto)


  


  


  0.1x10^3/uL


(0.0-0.7) 


 


 


Basophils # (Auto)


  


  


  0.0x10^3/uL


(0.0-0.2) 


 


 


Sodium Level


  


  


  139mmol/L


(136-145) 


 


 


Potassium Level


  


  


  3.8mmol/L


(3.5-5.1) 


 


 


Chloride Level


  


  


  103mmol/L


() 


 


 


Carbon Dioxide Level


  


  


  25mmol/L


(21-32) 


 


 


Anion Gap   11 (6-14)  


 


Blood Urea Nitrogen   9mg/dL (8-26)  


 


Creatinine


  


  


  0.6mg/dL


(0.7-1.3) 


 


 


Estimated GFR


(Cockcroft-Gault) 


  


  153.3 


  


 


 


Glucose Level


  


  


  111mg/dL


(70-99) 


 


 


Calcium Level


  


  


  9.2mg/dL


(8.5-10.1) 


 


 


Triglycerides Level


  


  


  33mg/dL


(0-150) 


 


 


Cholesterol Level


  


  


  126mg/dL


(0-200) 


 


 


LDL Cholesterol, Calculated


  


  


  57mg/dL


(0-100) 


 


 


VLDL Cholesterol, Calculated   7mg/dL (0-40)  


 


HDL Cholesterol


  


  


  62mg/dL


(40-60) 


 


 


Cholesterol/HDL Ratio   2.0  


 


Thyroid Stimulating Hormone


(TSH) 


  


  1.511uIU/mL


(0.358-3.74) 


 














Test


  3/6/17


10:18 


  


  


 


 


Glucose (Fingerstick)


  227mg/dL


(70-99) 


  


  


 











Physical Exam


HEENT:  Neck Supple W Full Motion


Chest:  Symmetric


LUNGS:  Clear to Auscultation


Heart:  S1S2, RRR


Abdomen:  Normal Aortic Impulse, Soft N/T


Extremities:  No Edema, No Calf Tenderness


Neurology:  alert, oriented, follow commands





Assessment


Assessment


1. Tachycardia


   unable to review tele- patient not on monitor (reaction to tele pads)


   remains tachy per vitals review; likely secondary to abdominal pain. 


   TSH WNL.  Echo pending. 


   will start low-dose BB


   Further recommendations pending diagnostics





2. Abdominal pain


   h/o  Crohn's disease and pancreatitis





3. History of bipolar disorder





4. Diabetes


   Per PCP








SARAHI GRIMALDO Mar 6, 2017 12:40

## 2017-03-06 NOTE — PDOC
PROGRESS NOTES


Chief Complaint


Chief Complaint


1. chronic Abd pain with NO Cronh's dz


2. loose BM , chronic


3. N/V with abd pain


4. chronic opoids user


5. DM2 UNcontrolled


6. bipolar disorder


7. ADHD


8. H/O panceatitis and diverticulosis


9/. recent right humerus fx wo sx 


10. hyperphosphatemia


11. obesity


12. gastroparesis


13. tachycardia, 2/2 adderall likely





plan:


1. fu with card


echo normal


2. cont pain meds


3. add reglan


dc tmr





History of Present Illness


History of Present Illness


ran out of pain meds for 2 weeks


still nausea


chronic abd pain, usually at left side, saying right side is new to him. neg CT





Vitals


Vitals





 Vital Signs








  Date Time  Temp Pulse Resp B/P Pulse Ox O2 Delivery O2 Flow Rate FiO2


 


3/6/17 12:31     99 Room Air  


 


3/6/17 10:49 97.7 112 18 127/75    





 97.7       











Physical Exam


General:  Alert, Oriented X3, Cooperative, No acute distress


Heart:  Regular rate


Lungs:  Clear


Abdomen:  Soft


Extremities:  No edema, Normal pulses, No tenderness/swelling


Skin:  No breakdown, Other (cellulitis on left arm)





Labs


LABS





Laboratory Tests








Test


  3/5/17


16:43 3/5/17


21:11 3/6/17


05:45 3/6/17


06:51


 


Glucose (Fingerstick)


  180mg/dL


(70-99) 243mg/dL


(70-99) 


  113mg/dL


(70-99)


 


White Blood Count


  


  


  7.3x10^3/uL


(4.0-11.0) 


 


 


Red Blood Count


  


  


  4.65x10^6/uL


(4.30-5.70) 


 


 


Hemoglobin


  


  


  12.2g/dL


(13.0-17.5) 


 


 


Hematocrit


  


  


  36.8%


(39.0-53.0) 


 


 


Mean Corpuscular Volume   79fL ()  


 


Mean Corpuscular Hemoglobin   26pg (25-35)  


 


Mean Corpuscular Hemoglobin


Concent 


  


  33g/dL (31-37) 


  


 


 


Red Cell Distribution Width


  


  


  16.1%


(11.5-14.5) 


 


 


Platelet Count


  


  


  276x10^3/uL


(140-400) 


 


 


Neutrophils (%) (Auto)   72% (31-73)  


 


Lymphocytes (%) (Auto)   16% (24-48)  


 


Monocytes (%) (Auto)   9% (0-9)  


 


Eosinophils (%) (Auto)   2% (0-3)  


 


Basophils (%) (Auto)   0% (0-3)  


 


Neutrophils # (Auto)


  


  


  5.3x10^3uL


(1.8-7.7) 


 


 


Lymphocytes # (Auto)


  


  


  1.2x10^3/uL


(1.0-4.8) 


 


 


Monocytes # (Auto)


  


  


  0.7x10^3/uL


(0.0-1.1) 


 


 


Eosinophils # (Auto)


  


  


  0.1x10^3/uL


(0.0-0.7) 


 


 


Basophils # (Auto)


  


  


  0.0x10^3/uL


(0.0-0.2) 


 


 


Sodium Level


  


  


  139mmol/L


(136-145) 


 


 


Potassium Level


  


  


  3.8mmol/L


(3.5-5.1) 


 


 


Chloride Level


  


  


  103mmol/L


() 


 


 


Carbon Dioxide Level


  


  


  25mmol/L


(21-32) 


 


 


Anion Gap   11 (6-14)  


 


Blood Urea Nitrogen   9mg/dL (8-26)  


 


Creatinine


  


  


  0.6mg/dL


(0.7-1.3) 


 


 


Estimated GFR


(Cockcroft-Gault) 


  


  153.3 


  


 


 


Glucose Level


  


  


  111mg/dL


(70-99) 


 


 


Calcium Level


  


  


  9.2mg/dL


(8.5-10.1) 


 


 


Triglycerides Level


  


  


  33mg/dL


(0-150) 


 


 


Cholesterol Level


  


  


  126mg/dL


(0-200) 


 


 


LDL Cholesterol, Calculated


  


  


  57mg/dL


(0-100) 


 


 


VLDL Cholesterol, Calculated   7mg/dL (0-40)  


 


HDL Cholesterol


  


  


  62mg/dL


(40-60) 


 


 


Cholesterol/HDL Ratio   2.0  


 


Thyroid Stimulating Hormone


(TSH) 


  


  1.511uIU/mL


(0.358-3.74) 


 














Test


  3/6/17


10:18 


  


  


 


 


Glucose (Fingerstick)


  227mg/dL


(70-99) 


  


  


 











Review of Systems


Review of Systems


no fever, chills, sob or chest pain





Assessment and Plan


Assessmemt and Plan


 Problems


Medical Problems:


(1) Abdominal pain


Status: Acute  





(2) Tachycardia


Status: Acute  








Problems:  





Comment


Review of Relevant


I have reviewed the following items keegan (where applicable) has been applied.


Labs





Laboratory Tests








Test


  3/4/17


16:57 3/4/17


21:00 3/5/17


07:19 3/5/17


11:06


 


Glucose (Fingerstick)


  247mg/dL


(70-99) 182mg/dL


(70-99) 288mg/dL


(70-99) 352mg/dL


(70-99)














Test


  3/5/17


16:43 3/5/17


21:11 3/6/17


05:45 3/6/17


06:51


 


Glucose (Fingerstick)


  180mg/dL


(70-99) 243mg/dL


(70-99) 


  113mg/dL


(70-99)


 


White Blood Count


  


  


  7.3x10^3/uL


(4.0-11.0) 


 


 


Red Blood Count


  


  


  4.65x10^6/uL


(4.30-5.70) 


 


 


Hemoglobin


  


  


  12.2g/dL


(13.0-17.5) 


 


 


Hematocrit


  


  


  36.8%


(39.0-53.0) 


 


 


Mean Corpuscular Volume   79fL ()  


 


Mean Corpuscular Hemoglobin   26pg (25-35)  


 


Mean Corpuscular Hemoglobin


Concent 


  


  33g/dL (31-37) 


  


 


 


Red Cell Distribution Width


  


  


  16.1%


(11.5-14.5) 


 


 


Platelet Count


  


  


  276x10^3/uL


(140-400) 


 


 


Neutrophils (%) (Auto)   72% (31-73)  


 


Lymphocytes (%) (Auto)   16% (24-48)  


 


Monocytes (%) (Auto)   9% (0-9)  


 


Eosinophils (%) (Auto)   2% (0-3)  


 


Basophils (%) (Auto)   0% (0-3)  


 


Neutrophils # (Auto)


  


  


  5.3x10^3uL


(1.8-7.7) 


 


 


Lymphocytes # (Auto)


  


  


  1.2x10^3/uL


(1.0-4.8) 


 


 


Monocytes # (Auto)


  


  


  0.7x10^3/uL


(0.0-1.1) 


 


 


Eosinophils # (Auto)


  


  


  0.1x10^3/uL


(0.0-0.7) 


 


 


Basophils # (Auto)


  


  


  0.0x10^3/uL


(0.0-0.2) 


 


 


Sodium Level


  


  


  139mmol/L


(136-145) 


 


 


Potassium Level


  


  


  3.8mmol/L


(3.5-5.1) 


 


 


Chloride Level


  


  


  103mmol/L


() 


 


 


Carbon Dioxide Level


  


  


  25mmol/L


(21-32) 


 


 


Anion Gap   11 (6-14)  


 


Blood Urea Nitrogen   9mg/dL (8-26)  


 


Creatinine


  


  


  0.6mg/dL


(0.7-1.3) 


 


 


Estimated GFR


(Cockcroft-Gault) 


  


  153.3 


  


 


 


Glucose Level


  


  


  111mg/dL


(70-99) 


 


 


Calcium Level


  


  


  9.2mg/dL


(8.5-10.1) 


 


 


Triglycerides Level


  


  


  33mg/dL


(0-150) 


 


 


Cholesterol Level


  


  


  126mg/dL


(0-200) 


 


 


LDL Cholesterol, Calculated


  


  


  57mg/dL


(0-100) 


 


 


VLDL Cholesterol, Calculated   7mg/dL (0-40)  


 


HDL Cholesterol


  


  


  62mg/dL


(40-60) 


 


 


Cholesterol/HDL Ratio   2.0  


 


Thyroid Stimulating Hormone


(TSH) 


  


  1.511uIU/mL


(0.358-3.74) 


 














Test


  3/6/17


10:18 


  


  


 


 


Glucose (Fingerstick)


  227mg/dL


(70-99) 


  


  


 








Laboratory Tests








Test


  3/5/17


16:43 3/5/17


21:11 3/6/17


05:45 3/6/17


06:51


 


Glucose (Fingerstick)


  180mg/dL


(70-99) 243mg/dL


(70-99) 


  113mg/dL


(70-99)


 


White Blood Count


  


  


  7.3x10^3/uL


(4.0-11.0) 


 


 


Red Blood Count


  


  


  4.65x10^6/uL


(4.30-5.70) 


 


 


Hemoglobin


  


  


  12.2g/dL


(13.0-17.5) 


 


 


Hematocrit


  


  


  36.8%


(39.0-53.0) 


 


 


Mean Corpuscular Volume   79fL ()  


 


Mean Corpuscular Hemoglobin   26pg (25-35)  


 


Mean Corpuscular Hemoglobin


Concent 


  


  33g/dL (31-37) 


  


 


 


Red Cell Distribution Width


  


  


  16.1%


(11.5-14.5) 


 


 


Platelet Count


  


  


  276x10^3/uL


(140-400) 


 


 


Neutrophils (%) (Auto)   72% (31-73)  


 


Lymphocytes (%) (Auto)   16% (24-48)  


 


Monocytes (%) (Auto)   9% (0-9)  


 


Eosinophils (%) (Auto)   2% (0-3)  


 


Basophils (%) (Auto)   0% (0-3)  


 


Neutrophils # (Auto)


  


  


  5.3x10^3uL


(1.8-7.7) 


 


 


Lymphocytes # (Auto)


  


  


  1.2x10^3/uL


(1.0-4.8) 


 


 


Monocytes # (Auto)


  


  


  0.7x10^3/uL


(0.0-1.1) 


 


 


Eosinophils # (Auto)


  


  


  0.1x10^3/uL


(0.0-0.7) 


 


 


Basophils # (Auto)


  


  


  0.0x10^3/uL


(0.0-0.2) 


 


 


Sodium Level


  


  


  139mmol/L


(136-145) 


 


 


Potassium Level


  


  


  3.8mmol/L


(3.5-5.1) 


 


 


Chloride Level


  


  


  103mmol/L


() 


 


 


Carbon Dioxide Level


  


  


  25mmol/L


(21-32) 


 


 


Anion Gap   11 (6-14)  


 


Blood Urea Nitrogen   9mg/dL (8-26)  


 


Creatinine


  


  


  0.6mg/dL


(0.7-1.3) 


 


 


Estimated GFR


(Cockcroft-Gault) 


  


  153.3 


  


 


 


Glucose Level


  


  


  111mg/dL


(70-99) 


 


 


Calcium Level


  


  


  9.2mg/dL


(8.5-10.1) 


 


 


Triglycerides Level


  


  


  33mg/dL


(0-150) 


 


 


Cholesterol Level


  


  


  126mg/dL


(0-200) 


 


 


LDL Cholesterol, Calculated


  


  


  57mg/dL


(0-100) 


 


 


VLDL Cholesterol, Calculated   7mg/dL (0-40)  


 


HDL Cholesterol


  


  


  62mg/dL


(40-60) 


 


 


Cholesterol/HDL Ratio   2.0  


 


Thyroid Stimulating Hormone


(TSH) 


  


  1.511uIU/mL


(0.358-3.74) 


 














Test


  3/6/17


10:18 


  


  


 


 


Glucose (Fingerstick)


  227mg/dL


(70-99) 


  


  


 








Medications





 Current Medications


Sodium Chloride 1,000 ml @  1,000 mls/hr Q1H IV  Last administered on 3/3/17at 

20:32;  Start 3/3/17 at 20:12;  Stop 3/3/17 at 21:11;  Status DC


Promethazine HCl/ Sodium Chloride (Phenergan/Iv Sodium Chloride 0.9% 50ml) 50.5 

ml @  151.5 mls/ hr 1X  ONCE IV  Last administered on 3/3/17at 20:33;  Start 3/3

/17 at 20:15;  Stop 3/3/17 at 20:34;  Status DC


Morphine Sulfate 4 mg 1X  ONCE IV  Last administered on 3/3/17at 20:32;  Start 3

/3/17 at 20:15;  Stop 3/3/17 at 20:16;  Status DC


Iohexol (Omnipaque 300 Mg/ml) 75 ml 1X  ONCE IV  Last administered on 3/3/17at 

21:21;  Start 3/3/17 at 20:30;  Stop 3/3/17 at 20:31;  Status DC


Info (Do NOT chart on this entry -- for MONITORING) 1 each PRN DAILY  PRN MC 

SEE COMMENTS;  Start 3/3/17 at 20:45;  Stop 3/5/17 at 20:44;  Status DC


Morphine Sulfate 4 mg 4 mg 1X  ONCE IV  Last administered on 3/3/17at 21:59;  

Start 3/3/17 at 21:45;  Stop 3/3/17 at 21:46;  Status DC


Sodium Chloride (Iv Sodium Chloride 0.9% 1000ml Bag) 1,000 ml @  1,000 mls/hr 

1X  ONCE IV  Last administered on 3/3/17at 22:45;  Start 3/3/17 at 22:45;  Stop 

3/3/17 at 23:44;  Status DC


Ondansetron HCl (Zofran) 4 mg PRN Q8HRS  PRN IV NAUSEA/VOMITING Last 

administered on 3/4/17at 13:30;  Start 3/3/17 at 22:45;  Stop 3/4/17 at 22:44;  

Status DC


Morphine Sulfate 4 mg 4 mg PRN Q2HR  PRN IV SEVERE PAIN Last administered on 3/4

/17at 21:01;  Start 3/3/17 at 22:45;  Stop 3/4/17 at 22:44;  Status DC


Sodium Chloride (Iv Sodium Chloride 0.9% 1000ml Bag) 1,000 ml @  75 mls/hr 

[id number] IV  Last administered on 3/4/17at 11:56;  Start 3/3/17 at 22:42;  Stop 3

/4/17 at 22:41;  Status DC


Acetaminophen (Tylenol) 650 mg PRN Q4HRS  PRN PO FEVER;  Start 3/3/17 at 22:45;

  Stop 3/4/17 at 22:44;  Status DC


Clonazepam (Klonopin) 1 mg TID PO  Last administered on 3/6/17at 08:08;  Start 3

/4/17 at 14:00


Insulin Aspart (Novolog) 20 units TIDAC SQ  Last administered on 3/6/17at 11:54

;  Start 3/4/17 at 11:30


Non-Formulary Medication 30 mg BID PO ADHD;  Start 3/4/17 at 21:00;  Status UNV


Gabapentin (Neurontin) 600 mg TID PO  Last administered on 3/6/17at 08:08;  

Start 3/4/17 at 14:00


Insulin Detemir (Levemir) 80 units QHS SQ  Last administered on 3/5/17at 21:36;

  Start 3/4/17 at 21:00


Oxycodone HCl (Roxicodone) 10 mg PRN Q4HRS  PRN PO PAIN Last administered on 3/6

/17at 12:31;  Start 3/4/17 at 11:30


Morphine Sulfate 4 mg 4 mg PRN Q2HR  PRN IV PAIN Last administered on 3/6/17at 

10:32;  Start 3/5/17 at 11:15


Ceftriaxone Sodium/Sodium Chloride (Rocephin/Iv Sodium Chloride 0.9% 50ml) 50 

ml @  100 mls/hr Q24H IV  Last administered on 3/5/17at 16:32;  Start 3/5/17 at 

16:00


Ondansetron HCl (Zofran) 4 mg PRN Q6HRS  PRN IV NAUSEA/VOMITING Last 

administered on 3/6/17at 10:31;  Start 3/6/17 at 09:30


Metoclopramide HCl (Reglan) 10 mg PRN Q6HRS  PRN IV NAUSEA/VOMITING;  Start 3/6/

17 at 11:30





Active Scripts


Active


Reported


Oxycodone Hcl 10 Mg Tablet 1 Tab PO Q4-6HRS PRN


Novolog Flexpen (Insulin Aspart) 100 Unit/1 Ml Insuln.pen 20 Unit SQ TIDAC


Gabapentin 600 Mg Tablet 600 Mg PO TID


Lantus Solostar (Insulin Glargine,Hum.rec.anlog) 100 Unit/1 Ml Insuln.pen 80 

Unit SQ QHS


Clonazepam 1 Mg Tablet 1 Tab PO TID


Adderall 30 Mg Tablet (Dextroamphetamine/Amphetamine) 30 Mg Tablet 30 Mg PO BID


Metformin Hcl 1,000 Mg Tablet 1,000 Mg PO BID


Vitals/I & O





 Vital Sign - Last 24 Hours








 3/5/17 3/5/17 3/5/17 3/5/17





 14:00 14:25 15:00 16:31


 


Temp  97.9  





  97.9  


 


Pulse  113  


 


Resp 20 18 20 0


 


B/P  135/83  


 


Pulse Ox 97 97  97


 


O2 Delivery Room Air Room Air  Room Air


 


    





    





 3/5/17 3/5/17 3/5/17 3/5/17





 17:01 19:00 20:00 23:00


 


Temp  99.0  98.4





  99.0  98.4


 


Pulse  124  122


 


Resp 20 18  18


 


B/P  134/88  129/84


 


Pulse Ox  96  96


 


O2 Delivery  Room Air Room Air Room Air


 


    





    





 3/6/17 3/6/17 3/6/17 3/6/17





 03:00 05:53 07:00 08:11


 


Temp 98.2  97.7 





 98.2  97.7 


 


Pulse 115  116 


 


Resp 18 20 18 


 


B/P 127/79  124/78 


 


Pulse Ox 95  99 99


 


O2 Delivery Room Air Room Air Room Air Room Air


 


    





    





 3/6/17 3/6/17 3/6/17 3/6/17





 09:11 10:32 10:49 11:02


 


Temp   97.7 





   97.7 


 


Pulse   112 


 


Resp   18 


 


B/P   127/75 


 


Pulse Ox 99 99 99 99


 


O2 Delivery Room Air Room Air Room Air Room Air


 


    





    





 3/6/17   





 12:31   


 


Pulse Ox 99   


 


O2 Delivery Room Air   














 Intake and Output   


 


 3/5/17 3/5/17 3/6/17





 15:00 23:00 07:00


 


Intake Total 240 ml 1040 ml 400 ml


 


Balance 240 ml 1040 ml 400 ml














SAEID ARCE MD Mar 6, 2017 13:46

## 2017-03-06 NOTE — CARD
--------------- APPROVED REPORT --------------





EXAM: Two-dimensional and M-mode echocardiogram with Doppler and color Doppler.



Other Information 

Quality : GoodHR: 113bpm

Rhythm : Tachycardia, PVC's



INDICATION

Tachycardia



2D DIMENSIONS 

RVDd3.3 (2.9-3.5cm)Left Atrium(2D)3.7 (1.6-4.0cm)

IVSd1.0 (0.7-1.1cm)Aortic Root(2D)3.1 (2.0-3.7cm)

LVDd5.1 (3.9-5.9cm)LVOT Diameter2.4 (1.8-2.4cm)

PWd1.0 (0.7-1.1cm)LVDs3.4 (2.5-4.0cm)

FS (%) 33.6 %SV77.7 ml

LVEF(%)62.0 (>50%)



Aortic Valve

AoV Peak Hugh.101.6cm/sAoV VTI14.4cm

AO Peak GR.4.1mmHgLVOT Peak Hugh.97.5cm/s

AO Mean GR.3mmHgAVA (VMAX)4.49cm2



Mitral Valve

MV E Xrjsicbv00.9cm/sMV E Peak Gr.3mmHg

MV DECEL [id number] A Iasicdts41.0cm/s

MV E Mean Gr.2mmHgMV [id number]

E/A  Ratio1.5MV A [id number]

MVA (PHT)6.17cm2



Tricuspid Valve

TR P. Ctiedisa065fu/sRAP [id number]

TR Peak Gr.[id number]



Pulmonary Vein

S1 Azjvpnaw05.8cm/sD2 Eomoqaea11.7cm/s

PVa [id number]



 LEFT VENTRICLE 

The left ventricle is normal size. There is normal left ventricular wall thickness. Left ventricle sy
stolic function is normal. The Ejection Fraction is 50-55%. There is normal LV segmental wall motion.
 The left ventricular diastolic function and filling is normal for age. There is no ventricular septa
l defect visualized.



 RIGHT VENTRICLE 

The right ventricle is normal size. The right ventricular systolic function is normal. Moderator band
 appears thickened.



 ATRIA 

The left atrium size is normal. The right atrium size is normal. Possible small atrial septal defect 
detected with color Doppler.



 AORTIC VALVE 

The aortic valve is trileaflet. The aortic valve is normal in structure and function. Doppler and Col
or Flow revealed no significant aortic regurgitation. There is no significant aortic valvular stenosi
s.



 MITRAL VALVE 

The mitral valve is normal in structure and function. There is no evidence of mitral valve prolapse. 
There is no mitral valve stenosis. Doppler and Color Flow revealed no mitral valve regurgitation note
d.



 TRICUSPID VALVE 

The tricuspid valve is normal in structure. Doppler and Color Flow revealed mild tricuspid regurgitat
ion. The PA pressure was estimated at 30 mmHg.



 PULMONIC VALVE 

The pulmonary valve is normal in structure and function. Doppler and Color Flow revealed no pulmonic 
valvular regurgitation.



 GREAT VESSELS 

The aortic root is normal in size. The ascending aorta is normal in size. Normal pulmonary venous rooseevlt
w (Doppler). The IVC is normal in size and collapses >50% with inspiration.



 PERICARDIAL EFFUSION 

There is no pleural effusion. There is no evidence of significant pericardial effusion.



Critical Notification

Critical Value: No



<Conclusion>

The left ventricle is normal size.

Left ventricle systolic function is normal.

The Ejection Fraction is 50-55%.

Possible small atrial septal defect detected with color Doppler.

There is no significant aortic valvular stenosis.

Doppler and Color Flow revealed no significant aortic regurgitation.

Doppler and Color Flow revealed no mitral valve regurgitation noted.

Doppler and Color Flow revealed mild tricuspid regurgitation.

The PA pressure was estimated at 30 mmHg.

There is no evidence of significant pericardial effusion.

## 2017-03-07 NOTE — PDOC3
Discharge Summary [facility]


Date of Admission:  Mar 3, 2017


Discharge Date:  Mar 7, 2017


Admitting Diagnosis


1. chronic Abd pain with NO Cronh's dz


2. loose BM , chronic


3. N/V with abd pain


4. chronic opoids user


5. DM2 UNcontrolled


6. bipolar disorder


7. ADHD


8. H/O panceatitis and diverticulosis


9/. recent right humerus fx wo sx 


10. hyperphosphatemia


11. obesity


12. gastroparesis


13. tachycardia, 2/2 adderall likely


Problems:  


Final Diagnosis


 Problems


Medical Problems:


(1) Abdominal pain


Status: Acute  





(2) Tachycardia


Status: Acute  








CONSULTS


card


Brief Hospital Course


Mr. Cheng  is a 36 old M, well know to us, no PCP, always comes here for 

pain meds when he ran it out.


tachycardia likely 2/2 adderall, echo neg.


chronic abd pain, abd CT neg.


dc home with oxycodone and klonipin 30 pills each,pt agreed to find a pcp in 2 

weeks.


dc time 35min








General:  Alert, Oriented X3, Cooperative, No acute distress


Heart:  Regular rate


Lungs:  Clear


Abdomen:  Soft


Extremities:  No edema, Normal pulses, No tenderness/swelling


Skin:  No breakdown, some erythema with iv access on hand


Patient History:  


Patient reports no known family medical history.


Problems:  


Disposition


home


CONDITION AT DISCHARGE:  Improved


Diet


regular


Scheduled


Clonazepam (Clonazepam) 1 TAB PO TID 


Dextroamphetamine/Amphetamine (Adderall 30 Mg Tablet) 30 MG PO BID (Reported) 


Gabapentin (Gabapentin) 600 MG PO TID (Reported) 


Insulin Aspart (Novolog Flexpen) 20 UNIT SQ TIDAC (Reported) 


Insulin Glargine,Hum.rec.anlog (Lantus Solostar) 80 UNIT SQ QHS (Reported) 


Metformin Hcl (Metformin Hcl) 1,000 MG PO BID (Reported) 





Scheduled PRN


Oxycodone Hcl (Oxycodone Hcl) 1 TAB PO Q4-6HRS PRN PRN PAIN 





Discontinued Medications


Insulin Detemir (Levemir) 45 UNIT SQ HS (Reported) 


Follow Up


pcp in 2 weeks








SAEID ARCE MD Mar 7, 2017 12:17

## 2017-03-26 NOTE — PDOC1
History and Physical


Date of Admission


Date of Admission


DATE: 3/26/17 


TIME: 14:07





Identification/Chief Complaint


Chief Complaint


vomiting black stuff





History of Present Illness


History of Present Illness


36 y.o  male who has been popping ibuprofen pills like tic tacs for 

shoulder pain afetr shoulder sx Dec 2016. Looking at old records, he would 

either do ibuprofen or dilaudid PO. Seemingly he ran out of Dilaudid PO (

happened in past admit), Also hx Crohns dx , claims gets flares of bloody BM

,. But none this admit. Significant black emesis, tested positive for blood? 

started on PPI gtt, Hgb 11, BP ok, slightly tachycardic though 1-teens.





Looking at old records, well known to Dr. devries: (below is gI note)


GI history/previous workup:


-diverticulitis w/ abscess requiring percutaneous drainage.


-pancreatitis related to Depakote use.


-two previous colonoscopies:  (after diverticulitis),  in [place] 

at which time he was told he had diverticulosis and Crohn's disease.


-no treatment for Crohn's except steroids during hospital admissions (last 

treated w/ oral prednisone here in 2016, has been started on IV steroids this 

admission).


-: GET markedly delayed w/ T1/2 time of 350 minutes, note h/o uncontrolled 

IDDM (A1c in 2016 was 12, initial glucose readings this admission in 500-600 

range, last check 257).


-: anti-Saccaromyces cerevisiae antibody borderline positive.


-2016: CT in Aug w/ mild sigmoid diverticulitis; CT in Sept w/ persistent 

nonspecific, questionably slightly increased hazy density of the mesenteric fat 

centrally, a few interspersed nonspecific nodes; CT in Nov w/ nonspecific 

chronic persistent fat stranding identified in mesenteric root similar to prior 

exam (nonspecific mesenteritis, less likely pancreatitis), diverticulosis, 

normal appendix.


-: CT in  (this admission) w/ possible mesenteric panniculitis and 

mildly prominent mesenteric/retroperitoneal lymph nodes (stable), diverticulosis

, and no evidence of Crohn's disease (mural thickening, fistula, abscess)











?H/o IBD/Crohn's


H/o diverticulitis


-history of abscess requiring drainage in the past, 


Gastroparesis


-abnormal GET in 


Uncontrolled DM with


hyperglycemia on admission


Left shoulder injury


-traumatic fracture, followed at [facility]


-has been on ibuprofen and Dilaudid (which he ran out of prior to ER evaluation)


Gastroparesis, uncontrolled DM likely contributing to GI symptom





HE claims was on Humira for his crohns did well with that, and just gets 

unrecalled dose pred for flares.








Past Medical History


Cardiovascular:  HTN


GI:  Inflam bowel disease, Irritable bowel disease


Heme/Onc:  No pertinent hx


Hepatobiliary:  No pertinent hx


Psych:  Bipolar, Depression


Musculoskeletal:   low back pain, Osteoarthritis


Rheumatologic:  No pertinent hx


Infectious disease:  No pertinent hx


Renal/:  No pertinent hx


Endocrine:  Diabetes





Past Surgical History


Past Surgical History:  No pertinent history





Family History


Family History:  No Significant, Hypertension





Social History


Smoke:  No


ALCOHOL:  none


Drugs:  None, Marijuana





Current Medications


Current Medications





 Current Medications


Hydromorphone HCl (Dilaudid) 1 mg 1X  ONCE IV  Last administered on 3/26/17at 12

:52;  Start 3/26/17 at 12:15;  Stop 3/26/17 at 12:20;  Status DC


Ondansetron HCl 4 mg 4 mg 1X  ONCE IV  Last administered on 3/26/17at 12:46;  

Start 3/26/17 at 12:15;  Stop 3/26/17 at 12:20;  Status DC


Sodium Chloride (Iv Sodium Chloride 0.9% 1000ml Bag) 1,000 ml @  1,000 mls/hr 

1X  ONCE IV  Last administered on 3/26/17at 12:38;  Start 3/26/17 at 12:15;  

Stop 3/26/17 at 13:14;  Status DC


Pantoprazole Sodium 80 mg 80 mg 1X  ONCE IVP  Last administered on 3/26/17at 12:

48;  Start 3/26/17 at 12:15;  Stop 3/26/17 at 12:20;  Status DC


Pantoprazole Sodium/Sodium Chloride (Protonix Iv/Iv Sodium Chloride 0.9% 100ml) 

100 ml @  10 mls/hr 1X  ONCE IV  Last administered on 3/26/17at 12:52;  Start 3/

26/17 at 12:15;  Stop 3/26/17 at 22:14


Iohexol (Omnipaque 300 Mg/ml) 75 ml 1X  ONCE IV ;  Start 3/26/17 at 12:30;  

Stop 3/26/17 at 12:31;  Status DC


Ondansetron HCl (Zofran) 4 mg PRN Q8HRS  PRN IV NAUSEA/VOMITING;  Start 3/26/17 

at 13:30;  Stop 3/26/17 at 14:07;  Status DC


Fentanyl Citrate 50 mcg 50 mcg PRN Q2HR  PRN IV PAIN;  Start 3/26/17 at 13:30;  

Stop 3/27/17 at 13:29;  Status UNV


Sodium Chloride (Iv Sodium Chloride 0.9% 1000ml Bag) 1,000 ml @  125 mls/hr Q8H 

IV ;  Start 3/26/17 at 13:19;  Stop 3/27/17 at 13:18


Hydromorphone HCl (Dilaudid) 1 mg 1X  ONCE IV  Last administered on 3/26/17at 13

:45;  Start 3/26/17 at 13:30;  Stop 3/26/17 at 13:31;  Status DC


Hydromorphone HCl (Dilaudid) 1 mg PRN Q2HR  PRN IV PAIN SEVERE;  Start 3/26/17 

at 13:30


Ondansetron HCl (Zofran) 4 mg PRN Q6HRS  PRN IV NAUSEA/VOMITING;  Start 3/26/17 

at 14:04;  Status UNV


Acetaminophen (Tylenol) 500 mg PRN Q6HRS  PRN PO MILD PAIN / TEMP;  Start 3/26/

17 at 14:15;  Status UNV


Pantoprazole Sodium (Protonix Vial) 40 mg BID IVP ;  Start 3/26/17 at 21:00;  

Status UNV


Clonazepam (Klonopin) 1 mg TID PO ;  Start 3/26/17 at 21:00;  Status UNV


Non-Formulary Medication 30 mg BID PO ADHD;  Start 3/26/17 at 21:00;  Status UNV


Non-Formulary Medication 600 mg TID PO mood stabilizer;  Start 3/26/17 at 21:00

;  Status UNV


Non-Formulary Medication 1 tab Q4-6HRS PRN PO PAIN;  Start 3/26/17 at 14:15;  

Status UNV





Active Scripts


Active


Oxycodone Hcl 10 Mg Tablet 1 Tab PO Q4-6HRS PRN


Clonazepam 1 Mg Tablet 1 Tab PO TID


Reported


Novolog Flexpen (Insulin Aspart) 100 Unit/1 Ml Insuln.pen 20 Unit SQ TIDAC


Gabapentin 600 Mg Tablet 600 Mg PO TID


Lantus Solostar (Insulin Glargine,Hum.rec.anlog) 100 Unit/1 Ml Insuln.pen 80 

Unit SQ QHS


Adderall 30 Mg Tablet (Dextroamphetamine/Amphetamine) 30 Mg Tablet 30 Mg PO BID


Metformin Hcl 1,000 Mg Tablet 1,000 Mg PO BID





Allergies


Allergies:  


Coded Allergies:  


     fentanyl (Verified  Allergy, Severe, Throat Swells--Anaphylaxis, 14)


     haloperidol (Verified  Allergy, Severe, "In ICU, almost .Toxicity.", )


     lithium (Verified  Allergy, Severe, "In ICU-almost . Toxicity", 14

)


     ketorolac (Verified  Allergy, Intermediate, Hives, 14)





ROS


General:  No: Appetite, Chills, Fatigue, Malaise, Night Sweats, Other


PSYCHOLOGICAL ROS:  No: Anxiety, Behavioral Disorder, Concentration difficultie

, Decreased libido, Depression, Disorientation, Hallucinations, Hostility, 

Irritablity, Memory difficulties, Mood Swings, Obsessive thoughts, Other, 

Physical abuse, Sexual abuse, Sleep disturbances, Suicidal ideation


Eyes:  No Blurry vision, No Decreased vision, No Double vision, No Dry eyes, No 

Excessive tearing, No Eye Pain, No Itchy Eyes, No Loss of vision, No Other, No 

Photophobia, No Scotomata, No Uses contacts, No Uses glasses


HEENT:  No: Epistaxis, Heacaches, Hearing change, Nasal congestion, Nasal 

discharge, Oral lesions, Other, Sinus pain, Sneezing, Snoring, Sore Throat, 

Tinnitus, Vertigo, Visual Changes, Vocal changes


ALLERGY AND IMMUNOLOGY:  No: Hives, Insect Bite Sensitivity, Itchy/Watery Eyes, 

Nasal Congestion, Other, Post Nasal Drip, Seasonal Allergies


Hematological and Lymphatic:  No: Bleeding Problems, Blood Clots, Blood 

Transfusions, Brusing, Night Sweats, Other, Pallor, Swollen Lymph Nodes


ENDOCRINE:  No: Breast Changes, Galactorrhea, Hair Pattern Changes, Hot Flashes

, Malaise/lethargy, Mood Swings, Other, Palpitations, Polydipsia/polyuria, Skin 

Changes, Temperature Intolerance, Unexpected Weight Changes


Breast:  No New/Changing Breast Lumps, No Nipple changes, No Nipple discharge, 

No Other


Respiratory:  No: Cough, Hemoptysis, Orthopnea, Other, Pleuritic Pain, SOB with 

excertion, Shortness of breath, Sputum Changes, Stridor, Tachypnea, Wheezing


Cardiovascular:  No Chest Pain, No Edema, No Lt Headedness, No Orthopnea, No 

Other, No Palpitations, No Paroxysmal Noc. Dyspnea


Gastrointestinal:  Yes Abdominal Pain, Yes Nausea, Yes Vomiting


Genitourinary:  No , No , No , No , No , No , No , No Discharge, No Dysuria, No 

Flank Pain, No Frequency, No Hematuria, No Incontinence, No Other, No Pain, No 

Retention, No Urgency


Neurological:  Yes Other (shoulder pain), 


   No Behavorial Changes, No Bowel/Bladder ControlChng, No Confusion, No 

Dizziness, No Gait Disturbance, No Headaches, No Impaired Coord/balance, No 

Memory Loss, No Numbness/Tingling, No Seizures, No Speech Problems, No Tremors, 

No Visual Changes, No Weakness


Skin:  No Acne, No Dry Skin, No Eczema, No Hair Changes, No Lumps, No Mole 

Changes, No Mottling, No Nail Changes, No Other, No Pruritus, No Rash, No Skin 

Lesion Changes





Physical Exam


General:  Alert, Oriented X3, Cooperative, No acute distress


HEENT:  Atraumatic, PERRLA, EOMI


Lungs:  Clear to auscultation, Normal air movement


Heart:  S1S2, RRR, no thrills, no rubs, no gallops, no murmurs


Cardiovascular:  S1, S2


Breasts:  Normal


Abdomen:  Normal bowel sounds, Soft, No tenderness, No hepatosplenomegaly, No 

masses


Male Genitals Exam:  normal genitalia, normal prostate


Rectal Exam:  not examined


PELVIC:  Nml ext genitalia


Extremities:  No clubbing, No cyanosis, No edema, Normal pulses, No tenderness/

swelling


Skin:  No rashes, No breakdown, No significant lesion


Neuro:  Normal gait, Normal speech, Strength at 5/5 X4 ext, Normal tone, 

Sensation intact, Cranial nerves 3-12 NL, Reflexes 2+


Psych/Mental Status:  Mental status NL, Mood NL





Vitals


Vitals





 Vital Signs








  Date Time  Temp Pulse Resp B/P Pulse Ox O2 Delivery O2 Flow Rate FiO2


 


3/26/17 12:52   20     


 


3/26/17 11:44 97.4 121  122/81 99 Room Air  





 97.4       











Labs


Labs





Laboratory Tests








Test


  3/26/17


13:08


 


White Blood Count


  5.2x10^3/uL


(4.0-11.0)


 


Red Blood Count


  4.56x10^6/uL


(4.30-5.70)


 


Hemoglobin


  11.5g/dL


(13.0-17.5)


 


Hematocrit


  36.1%


(39.0-53.0)


 


Mean Corpuscular Volume 79fL () 


 


Mean Corpuscular Hemoglobin 25pg (25-35) 


 


Mean Corpuscular Hemoglobin


Concent 32g/dL (31-37) 


 


 


Red Cell Distribution Width


  15.4%


(11.5-14.5)


 


Platelet Count


  290x10^3/uL


(140-400)


 


Neutrophils (%) (Auto) 68% (31-73) 


 


Lymphocytes (%) (Auto) 22% (24-48) 


 


Monocytes (%) (Auto) 7% (0-9) 


 


Eosinophils (%) (Auto) 2% (0-3) 


 


Basophils (%) (Auto) 1% (0-3) 


 


Neutrophils # (Auto)


  3.5x10^3uL


(1.8-7.7)


 


Lymphocytes # (Auto)


  1.1x10^3/uL


(1.0-4.8)


 


Monocytes # (Auto)


  0.4x10^3/uL


(0.0-1.1)


 


Eosinophils # (Auto)


  0.1x10^3/uL


(0.0-0.7)


 


Basophils # (Auto)


  0.0x10^3/uL


(0.0-0.2)


 


Sodium Level


  138mmol/L


(136-145)


 


Potassium Level


  4.1mmol/L


(3.5-5.1)


 


Chloride Level


  100mmol/L


()


 


Carbon Dioxide Level


  28mmol/L


(21-32)


 


Anion Gap 10 (6-14) 


 


Blood Urea Nitrogen 12mg/dL (8-26) 


 


Creatinine


  0.8mg/dL


(0.7-1.3)


 


Estimated GFR


(Cockcroft-Gault) 110.0 


 


 


BUN/Creatinine Ratio 15 (6-20) 


 


Glucose Level


  125mg/dL


(70-99)


 


Calcium Level


  9.4mg/dL


(8.5-10.1)


 


Total Bilirubin


  0.2mg/dL


(0.2-1.0)


 


Aspartate Amino Transf


(AST/SGOT) 15U/L (15-37) 


 


 


Alanine Aminotransferase


(ALT/SGPT) 25U/L (16-63) 


 


 


Alkaline Phosphatase


  134U/L


()


 


Total Protein


  7.9g/dL


(6.4-8.2)


 


Albumin


  3.3g/dL


(3.4-5.0)


 


Albumin/Globulin Ratio 0.7 (1.0-1.7) 


 


Lipase 68U/L () 








Laboratory Tests








Test


  3/26/17


13:08


 


White Blood Count


  5.2x10^3/uL


(4.0-11.0)


 


Red Blood Count


  4.56x10^6/uL


(4.30-5.70)


 


Hemoglobin


  11.5g/dL


(13.0-17.5)


 


Hematocrit


  36.1%


(39.0-53.0)


 


Mean Corpuscular Volume 79fL () 


 


Mean Corpuscular Hemoglobin 25pg (25-35) 


 


Mean Corpuscular Hemoglobin


Concent 32g/dL (31-37) 


 


 


Red Cell Distribution Width


  15.4%


(11.5-14.5)


 


Platelet Count


  290x10^3/uL


(140-400)


 


Neutrophils (%) (Auto) 68% (31-73) 


 


Lymphocytes (%) (Auto) 22% (24-48) 


 


Monocytes (%) (Auto) 7% (0-9) 


 


Eosinophils (%) (Auto) 2% (0-3) 


 


Basophils (%) (Auto) 1% (0-3) 


 


Neutrophils # (Auto)


  3.5x10^3uL


(1.8-7.7)


 


Lymphocytes # (Auto)


  1.1x10^3/uL


(1.0-4.8)


 


Monocytes # (Auto)


  0.4x10^3/uL


(0.0-1.1)


 


Eosinophils # (Auto)


  0.1x10^3/uL


(0.0-0.7)


 


Basophils # (Auto)


  0.0x10^3/uL


(0.0-0.2)


 


Sodium Level


  138mmol/L


(136-145)


 


Potassium Level


  4.1mmol/L


(3.5-5.1)


 


Chloride Level


  100mmol/L


()


 


Carbon Dioxide Level


  28mmol/L


(21-32)


 


Anion Gap 10 (6-14) 


 


Blood Urea Nitrogen 12mg/dL (8-26) 


 


Creatinine


  0.8mg/dL


(0.7-1.3)


 


Estimated GFR


(Cockcroft-Gault) 110.0 


 


 


BUN/Creatinine Ratio 15 (6-20) 


 


Glucose Level


  125mg/dL


(70-99)


 


Calcium Level


  9.4mg/dL


(8.5-10.1)


 


Total Bilirubin


  0.2mg/dL


(0.2-1.0)


 


Aspartate Amino Transf


(AST/SGOT) 15U/L (15-37) 


 


 


Alanine Aminotransferase


(ALT/SGPT) 25U/L (16-63) 


 


 


Alkaline Phosphatase


  134U/L


()


 


Total Protein


  7.9g/dL


(6.4-8.2)


 


Albumin


  3.3g/dL


(3.4-5.0)


 


Albumin/Globulin Ratio 0.7 (1.0-1.7) 


 


Lipase 68U/L () 











VTE Prophylaxis Ordered


VTE Prophylaxis Devices:  Contraindicated


VTE Pharmacological Prophylaxi:  Contraindicated





Assessment/Plan


Assessment/Plan


1. Black emesis, likely blood from heavy NSAID use for left shoulder traumatic 

injury Dec 2016


2 H/o IBD/Crohn's NOT in flare


-diagnosed elsewhere in s/o HUmira in past (did well) and just on PO pred 

for flares


3. H/o diverticulitis


-history of abscess requiring drainage in the past, mild in the sigmoid on CT 2016, other CTs in 2016, 10/2016, 2016, and 2017 showed diverticulosis w/

o diverticulitis


4. Gastroparesis


-abnormal GET in 


5. Uncontrolled DM


-A1c 12.0 in 2016


6.Left shoulder injury


-traumatic fracture, followed at 


-has been on ibuprofen and Dilaudid 





PLAn:


NPO, IVF


Gastro occult if not yet done


Agree with PPI gtt


GI consult\


NO nSAIDS


Hold insuln, metformin since NPO


SSI ACHS


SCds only


 HH kassidy





seen at ER


Dw ER AL ADAMS MD Mar 26, 2017 14:21

## 2017-03-26 NOTE — PHYS DOC
Past Medical History


Past Medical History:  Diabetes-Type II, Diverticulosis, Pancreatitis


Additional Past Medical Histor:  BI-POLAR, CHRONES DISEASE, ADHD


Past Surgical History:  No Surgical History


Additional Past Surgical Histo:  Colonoscopy, EGD


Alcohol Use:  Occasionally


Drug Use:  Marijuana, Methamphetamine


Social History Narrative:  pt denies 3/26/17





Adult General


Chief Complaint


Chief Complaint:  HEMATEMESIS/VOMITING BLOOD





HPI


HPI


36-year-old male with history of Crohn's and diverticulosis who presents with 3 

days of coffee-ground emesis with epigastric tenderness. Patient states he has 

been having ongoing right shoulder pain after surgery for the last 1 month and 

has been taking 6-8 800 mg Motrin tablets daily. He states initially he had 

streaks of bright red blood in his vomit and then it became coffee-ground in 

consistency. He localizes his pain to the epigastrium. He rates his pain an 8 

out of 10. He states he last vomited earlier today. He has not had anything to 

eat or drink today. He did have a bowel movement earlier that was normal. He 

denies any dark stools or blood in his stool at all. He states his last EGD/

colonoscopy was several years ago. He currently follows up at the [facility] clinic. 

He denies any fever or chills. He denies any chest pain or shortness of breath.





Review of Systems


Review of Systems





Constitutional: Denies fever or chills []


Eyes: Denies change in visual acuity, redness, or eye pain []


HENT: Denies nasal congestion or sore throat []


Respiratory: Denies cough or shortness of breath []


Cardiovascular: No additional information not addressed in HPI []


GI: Has abdominal pain, has nausea, has vomiting, denies bloody stools or 

diarrhea []


: Denies dysuria or hematuria []


Musculoskeletal: Denies back pain or joint pain []


Integument: Denies rash or skin lesions []


Neurologic: Denies headache, focal weakness or sensory changes []


Endocrine: Denies polyuria or polydipsia []





Current Medications


Current Medications





 Current Medications








 Medications


  (Trade)  Dose


 Ordered  Sig/Eden  Start Time


 Stop Time Status Last Admin


Dose Admin


 


 Hydromorphone HCl


  (Dilaudid)  1 mg  1X  ONCE  3/26/17 12:15


 3/26/17 12:20 DC 3/26/17 12:52


1 MG


 


 Iohexol 75 ml  75 ml  1X  ONCE  3/26/17 12:30


 3/26/17 12:31 DC  


 


 


 Ondansetron HCl


  (Zofran)  4 mg  1X  ONCE  3/26/17 12:15


 3/26/17 12:20 DC 3/26/17 12:46


4 MG


 


 Pantoprazole


 Sodium 80 mg  80 mg  1X  ONCE  3/26/17 12:15


 3/26/17 12:20 DC 3/26/17 12:48


80 MG


 


 Pantoprazole


 Sodium/Sodium


 Chloride


  (Protonix Iv/Iv


 Sodium Chloride


 0.9% 100ml)  100 ml @ 


 10 mls/hr  1X  ONCE  3/26/17 12:15


 3/26/17 22:14  3/26/17 12:52


10 MLS/HR


 


 Sodium Chloride


  (Iv Sodium


 Chloride 0.9%


 1000ml Bag)  1,000 ml @ 


 125 mls/hr  Q8H  3/26/17 13:19


 3/27/17 13:18   


 











Allergies


Allergies





 Allergies








Coded Allergies Type Severity Reaction Last Updated Verified


 


  fentanyl Allergy Severe Throat Swells--Anaphylaxis 14 Yes


 


  haloperidol Allergy Severe "In ICU, almost .Toxicity." 14 Yes


 


  lithium Allergy Severe "In ICU-almost . Toxicity" 14 Yes


 


  ketorolac Allergy Intermediate Hives 14 Yes











Physical Exam


Physical Exam





Constitutional: Well developed, well nourished, no acute distress, non-toxic 

appearance. []


HENT: Normocephalic, atraumatic, bilateral external ears normal, oropharynx 

moist, no oral exudates, nose normal. []


Eyes: PERRLA, EOMI, conjunctiva normal, no discharge. [] 


Neck: Normal range of motion, no tenderness, supple, no stridor. [] 


Cardiovascular:Heart rate regular rhythm, no murmur []


Lungs & Thorax:  Bilateral breath sounds clear to auscultation []


Abdomen: Bowel sounds normal, soft, moderate epigastric and LUQ tenderness, no 

masses, no pulsatile masses. [] 


Skin: Warm, dry, no erythema, no rash. [] 


Back: No tenderness, no CVA tenderness. [] 


Extremities: No tenderness, no cyanosis, no clubbing, ROM intact, no edema. [] 


Neurologic: Alert and oriented X 3, normal motor function, normal sensory 

function, no focal deficits noted. []


Psychologic: Affect normal, judgement normal, mood normal. []





Current Patient Data


Vital Signs





 Vital Signs








  Date Time  Temp Pulse Resp B/P Pulse Ox O2 Delivery O2 Flow Rate FiO2


 


3/26/17 13:24  108 15 116/66 96 Room Air  


 


3/26/17 11:44 97.4       





 97.4       








Lab Values





 Laboratory Tests








Test


  3/26/17


13:08


 


White Blood Count


  5.2x10^3/uL


(4.0-11.0)


 


Red Blood Count


  4.56x10^6/uL


(4.30-5.70)


 


Hemoglobin


  11.5g/dL


(13.0-17.5)  L


 


Hematocrit


  36.1%


(39.0-53.0)  L


 


Mean Corpuscular Volume 79fL ()  


 


Mean Corpuscular Hemoglobin 25pg (25-35)  


 


Mean Corpuscular Hemoglobin


Concent 32g/dL (31-37)


 


 


Red Cell Distribution Width


  15.4%


(11.5-14.5)  H


 


Platelet Count


  290x10^3/uL


(140-400)


 


Neutrophils (%) (Auto) 68% (31-73)  


 


Lymphocytes (%) (Auto) 22% (24-48)  L


 


Monocytes (%) (Auto) 7% (0-9)  


 


Eosinophils (%) (Auto) 2% (0-3)  


 


Basophils (%) (Auto) 1% (0-3)  


 


Neutrophils # (Auto)


  3.5x10^3uL


(1.8-7.7)


 


Lymphocytes # (Auto)


  1.1x10^3/uL


(1.0-4.8)


 


Monocytes # (Auto)


  0.4x10^3/uL


(0.0-1.1)


 


Eosinophils # (Auto)


  0.1x10^3/uL


(0.0-0.7)


 


Basophils # (Auto)


  0.0x10^3/uL


(0.0-0.2)


 


Sodium Level


  138mmol/L


(136-145)


 


Potassium Level


  4.1mmol/L


(3.5-5.1)


 


Chloride Level


  100mmol/L


()


 


Carbon Dioxide Level


  28mmol/L


(21-32)


 


Anion Gap 10 (6-14)  


 


Blood Urea Nitrogen


  12mg/dL (8-26)


 


 


Creatinine


  0.8mg/dL


(0.7-1.3)


 


Estimated GFR


(Cockcroft-Gault) 110.0  


 


 


BUN/Creatinine Ratio 15 (6-20)  


 


Glucose Level


  125mg/dL


(70-99)  H


 


Calcium Level


  9.4mg/dL


(8.5-10.1)


 


Total Bilirubin


  0.2mg/dL


(0.2-1.0)


 


Aspartate Amino Transferase


(AST) 15U/L (15-37)  


 


 


Alanine Aminotransferase (ALT) 25U/L (16-63)  


 


Alkaline Phosphatase


  134U/L


()  H


 


Total Protein


  7.9g/dL


(6.4-8.2)


 


Albumin


  3.3g/dL


(3.4-5.0)  L


 


Albumin/Globulin Ratio


  0.7 (1.0-1.7)


L


 


Lipase


  68U/L ()


L





 Laboratory Tests


3/26/17 13:08








 Laboratory Tests


3/26/17 13:08











EKG


EKG


[]





Radiology/Procedures


Radiology/Procedures


Indication epigastric pain. History of Crohn's disease. History of


diverticulitis and pancreatitis.





IV contrast imaging through the abdomen and pelvis was performed. 75 cc of


Omnipaque 300 was administered intravenously. No oral contrast was


administered. Note is made of a similar examination just over 3 weeks earlier.





There is some minimal volume loss at the left lung base likely reflecting


atelectasis. No focal mass is seen in the liver or spleen and the gallbladder


appears grossly normal. No pancreatic abnormality is seen. There are a few


small lymph nodes in the abdominal mesentery. These are probably incidental.


Definite pathologic central or retroperitoneal adenopathy is not seen. The


adrenal glands and kidneys appear normal. A mass inflammatory process or acute


finding within the abdomen is not seen. Occasional diverticula are seen


associated with the large bowel. Active inflammation is not seen. Acute


finding in the pelvis is not apparent.





IMPRESSION: No acute finding seen in the abdomen or pelvis.


                          Slight volume loss in the left lower lobe likely


reflects atelectasis





Course & Med Decision Making


Course & Med Decision Making


Pertinent Labs and Imaging studies reviewed. (See chart for details)





This 36-year-old male who is likely having a gastric ulcer from his overuse of 

NSAIDs will be admitted to the hospital for further evaluation and treatment. 

Patient will be given a bolus of Protonix and started on Protonix drip. I will 

do full laboratory workup and imaging of his abdomen as well. GI consult will 

be placed. I counseled the patient at length that he is to discontinue all 

NSAID use until his ulcer heals.


His laboratory workup is unrevealing. A CT of his abdomen and pelvis did not 

reveal any acute abnormality. I will be admitting him for his ongoing coffee-

ground emesis and abdominal pain. GI consult was placed. I discussed the need 

to admit the patient with the hospitalist, Dr. Mendez, who agreed to accept 

the patient for further evaluation and treatment.





Dragon Disclaimer


Dragon Disclaimer


This electronic medical record was generated, in whole or in part, using a 

voice recognition dictation system.





Departure


Departure


Impression:  


 Primary Impression:  


 Abdominal pain


 Additional Impression:  


 Coffee ground emesis


Disposition:   ADMITTED AS INPATIENT


Admitting Physician:  May Mendez


Condition:  STABLE


Referrals:  


NO PCP (PCP)





Problem Qualifiers








PAULA HUGHES DO Mar 26, 2017 12:22

## 2017-03-26 NOTE — [facility]
Admission Forms Criteria


                                                                         


                           ABDOMINAL PAIN





Clinical Indications for Admission to Inpatient Care


 


                                                                               

      (Place 'X' for any and all applicable criteria):





Admission is indicated for ANY ONE of the following(1)(2)(3)(4)(5):


[X]I.      Inpatient admission required rather than observation care (Also use 

Abdominal Pain: Observation Care, 


           as appropriate) because of ANY ONE of the following:


            [X]a)   Severe pain requiring acute inpatient management


            [ ]b)    Identification of etiology/finding that requires inpatient 

care (eg, aortic dissection, free air)


            [ ]c)    Absent bowel sounds with complete ileus(6)  


            [ ]d)    Suspected toxic megacolon


            [ ]e)    Severe electrolyte abnormalities requiring inpatient care


            [ ]f)     High fever or infection requiring inpatient admission as 

indicated by ANY ONE of following(7)(8):


                       [ ] i)    Appropriate outpatient or observational care 

antimicrobial treatment unavailable, not effective, or not feasible


                       [ ] ii)   Documented bacteremia 


                       [ ] iii)  Temperature > 104.9 degrees F (oral)


                       [ ] iv)  T >103.1 F (oral) or < 96.8 F(rectal) that does 

not respond to all emergency treatment measures


            [ ]g)     Signs of intestinal obstruction [B] 


            [ ]h)     Hemodynamic instability


            [ ]i)      IV fluid to replace significant ongoing losses (greater 

than 3 L/m2 per day) (12)(13)


            [ ]j)      Percutaneous or open drainage (eg, abscess, biliary tract

) procedures


            [ ]k)     Parenteral nutrition regimen that must be implemented on 

inpatient basis   


            [ ]l)      Other condition,treatment or monitoring requiring 

inpatient admission.


[ ]II.      Peritoneal signs present


[ ]III.     Surgery needed that cannot be performed on an ambulatory basis.


[ ]IV.    Evaluation requires patient to not eat or drink for extended period (

eg, more than 24 hours).





[ ]V.     Contraindications and/or Inappropriate clinical situations for 

Observational Care in patients with


           abdominal pain, when ANY ONE of the following is required:


           [ ]a)  Thorough evaluation is required to prevent catastrophic 

events due to delays in diagnosing  


                   (e.g.Mesenteric ischemia) 1,3


           [ ]b)  Patient with severe pathology or with chronic symptoms 

unlikely to improve in the ED stay (3)


[ ]VI.    General contraindications and/or Inappropriate clinical situations 

for Observational Care in patients


           with abdominal pain, when ANY ONE of the following is required:


           [ ]a)   Prediction of prolongation of LOS based on ANY ONE of the 

following may be considered as 


                    a contraindication for observational care 2, 3, 4, 5, 6, 7, 

8, 9, 10, 11


                    [ ]i)    Age > 65 yrs.


                    [ ]ii)   Patient arriving by ambulance


                    [ ]iii)  Patient with high acuity


                    [ ]iv)  Patient requiring vital sign monitoring


                    [ ]v)   Patient on IV medication


           [ ]b)   Systolic blood pressures  180mmHg 3,12


           [ ]c)   Patient with altered mental status including delirium and 

other alteration of consciousness, (3)


           [ ]d)   Patient whose discharge disposition will be to a skilled 

nursing home or rehabilitation home should 


                    not be managed in Emergency Department Observation Unit. 

CMS rule requires 3 days hospital stay before such placement.3,13


           [ ]e)   Patient with failure to thrive due to broad array of 

etiologies 3,16,17


           [ ]f)    Inability to ambulate 3,14








Extended stay beyond goal length of stay may be needed for(2)(3):


[ ]a)   Persistent abdominal pain with suspected intra-abdominal process


[ ]b)   Diagnosed condition requiring continued stay (e.g., pancreatitis, 

complicated diverticulitis)                                            


[ ]c)   Surgery (e.g., colectomy)                


    





The original [facility] content created by [employer] has been revised. 


The portions of the content which have been revised are identified through the 

use of italic text or in bold, and [facility] 


has neither reviewed nor approved the modified material.All other unmodified 

content is copyright  [employer].





Please see references footnoted in the original [facility] edition 

2016


Admission Criteria Met?:  Yes








TIRSO POWELL Mar 26, 2017 18:29

## 2017-03-26 NOTE — RAD
Indication epigastric pain. History of Crohn's disease. History of

diverticulitis and pancreatitis.



IV contrast imaging through the abdomen and pelvis was performed. 75 cc of

Omnipaque 300 was administered intravenously. No oral contrast was

administered. Note is made of a similar examination just over 3 weeks earlier.



There is some minimal volume loss at the left lung base likely reflecting

atelectasis. No focal mass is seen in the liver or spleen and the gallbladder

appears grossly normal. No pancreatic abnormality is seen. There are a few

small lymph nodes in the abdominal mesentery. These are probably incidental.

Definite pathologic central or retroperitoneal adenopathy is not seen. The

adrenal glands and kidneys appear normal. A mass inflammatory process or acute

finding within the abdomen is not seen. Occasional diverticula are seen

associated with the large bowel. Active inflammation is not seen. Acute

finding in the pelvis is not apparent.



IMPRESSION: No acute finding seen in the abdomen or pelvis.

                          Slight volume loss in the left lower lobe likely

reflects atelectasis

## 2017-03-27 NOTE — PDOC4
PROCEDURE


Procedure


EGD/biopsies





Indication: coffee-ground emesis





Meds: per anesthesia





Findings;





E-irregular Z-line c/w mild, <grade I reflux.  No MW, etc.


G-small amount of retained fluid/solids from last night probably.  Majority of 

stomach seen well and no lesions.  Biopsies from antrum re: H.pylori.


D-Normal to distal second portion.





Charan. well.





Imp:  Mild reflux/retained food c/w known gastroparesis.





Rec: clears


        prep/colon tomorrow re: really has IBD?


        Continue/start PPI; give prokinetic at least for prep.





Thanks.








KAROL MARTELL MD Mar 27, 2017 14:31

## 2017-03-27 NOTE — PDOC
PROGRESS NOTES


Chief Complaint


Chief Complaint


hematemesis


Abd pain








ASSESSMENT AND PLAN:


1.  Hematemesis:  prob NSAIDs induced.  appreciate GI service input:  EGD today


2.  Crohn's:  hx of Humera use, steroids for flares


3.  Gastroparesis:  most likely 2/2 DM.  reglan bid


4.  DM: poorly controlled with holding of home lantus/reg insulin.  restart, 

lower dose


5.  Shoulder pain:  chronic.  gets refills with admit, missed pain clinic eval 

x2 2/2 admissions here.  narcotic dependent.  IV/PO dilaudid








Vitals


Vitals





 Vital Signs








  Date Time  Temp Pulse Resp B/P Pulse Ox O2 Delivery O2 Flow Rate FiO2


 


3/27/17 15:18      Room Air  


 


3/27/17 15:00 97.3 98 20 124/80 99   





 97.3       


 


3/27/17 14:34       2 











Physical Exam


General:  Alert, Oriented X3, Cooperative, No acute distress


Heart:  Regular rate


Lungs:  Clear


Abdomen:  Normal bowel sounds, Soft, No tenderness


Extremities:  No clubbing


Skin:  No rashes





Labs


LABS





Laboratory Tests








Test


  3/26/17


17:23 3/26/17


23:21 3/27/17


05:20 3/27/17


07:05


 


Glucose (Fingerstick)


  138mg/dL


(70-99) 257mg/dL


(70-99) 


  199mg/dL


(70-99)


 


White Blood Count


  


  


  4.2x10^3/uL


(4.0-11.0) 


 


 


Red Blood Count


  


  


  4.35x10^6/uL


(4.30-5.70) 


 


 


Hemoglobin


  


  


  11.1g/dL


(13.0-17.5) 


 


 


Hematocrit


  


  


  34.2%


(39.0-53.0) 


 


 


Mean Corpuscular Volume   79fL ()  


 


Mean Corpuscular Hemoglobin   26pg (25-35)  


 


Mean Corpuscular Hemoglobin


Concent 


  


  32g/dL (31-37) 


  


 


 


Red Cell Distribution Width


  


  


  15.6%


(11.5-14.5) 


 


 


Platelet Count


  


  


  304x10^3/uL


(140-400) 


 


 


Neutrophils (%) (Auto)   51% (31-73)  


 


Lymphocytes (%) (Auto)   37% (24-48)  


 


Monocytes (%) (Auto)   8% (0-9)  


 


Eosinophils (%) (Auto)   4% (0-3)  


 


Basophils (%) (Auto)   1% (0-3)  


 


Neutrophils # (Auto)


  


  


  2.1x10^3uL


(1.8-7.7) 


 


 


Lymphocytes # (Auto)


  


  


  1.5x10^3/uL


(1.0-4.8) 


 


 


Monocytes # (Auto)


  


  


  0.3x10^3/uL


(0.0-1.1) 


 


 


Eosinophils # (Auto)


  


  


  0.2x10^3/uL


(0.0-0.7) 


 


 


Basophils # (Auto)


  


  


  0.0x10^3/uL


(0.0-0.2) 


 


 


Sodium Level


  


  


  136mmol/L


(136-145) 


 


 


Potassium Level


  


  


  4.7mmol/L


(3.5-5.1) 


 


 


Chloride Level


  


  


  102mmol/L


() 


 


 


Carbon Dioxide Level


  


  


  27mmol/L


(21-32) 


 


 


Anion Gap   7 (6-14)  


 


Blood Urea Nitrogen   8mg/dL (8-26)  


 


Creatinine


  


  


  0.6mg/dL


(0.7-1.3) 


 


 


Estimated GFR


(Cockcroft-Gault) 


  


  153.3 


  


 


 


Glucose Level


  


  


  268mg/dL


(70-99) 


 


 


Calcium Level


  


  


  8.8mg/dL


(8.5-10.1) 


 














Test


  3/27/17


12:25 


  


  


 


 


Glucose (Fingerstick)


  229mg/dL


(70-99) 


  


  


 











Review of Systems


Review of Systems


"can you come back later?  I'm trying to sleep here"





Comment


Review of Relevant


I have reviewed the following items keegan (where applicable) has been applied.


Labs





Laboratory Tests








Test


  3/26/17


13:08 3/26/17


17:23 3/26/17


23:21 3/27/17


05:20


 


White Blood Count


  5.2x10^3/uL


(4.0-11.0) 


  


  4.2x10^3/uL


(4.0-11.0)


 


Red Blood Count


  4.56x10^6/uL


(4.30-5.70) 


  


  4.35x10^6/uL


(4.30-5.70)


 


Hemoglobin


  11.5g/dL


(13.0-17.5) 


  


  11.1g/dL


(13.0-17.5)


 


Hematocrit


  36.1%


(39.0-53.0) 


  


  34.2%


(39.0-53.0)


 


Mean Corpuscular Volume 79fL ()    79fL () 


 


Mean Corpuscular Hemoglobin 25pg (25-35)    26pg (25-35) 


 


Mean Corpuscular Hemoglobin


Concent 32g/dL (31-37) 


  


  


  32g/dL (31-37) 


 


 


Red Cell Distribution Width


  15.4%


(11.5-14.5) 


  


  15.6%


(11.5-14.5)


 


Platelet Count


  290x10^3/uL


(140-400) 


  


  304x10^3/uL


(140-400)


 


Neutrophils (%) (Auto) 68% (31-73)    51% (31-73) 


 


Lymphocytes (%) (Auto) 22% (24-48)    37% (24-48) 


 


Monocytes (%) (Auto) 7% (0-9)    8% (0-9) 


 


Eosinophils (%) (Auto) 2% (0-3)    4% (0-3) 


 


Basophils (%) (Auto) 1% (0-3)    1% (0-3) 


 


Neutrophils # (Auto)


  3.5x10^3uL


(1.8-7.7) 


  


  2.1x10^3uL


(1.8-7.7)


 


Lymphocytes # (Auto)


  1.1x10^3/uL


(1.0-4.8) 


  


  1.5x10^3/uL


(1.0-4.8)


 


Monocytes # (Auto)


  0.4x10^3/uL


(0.0-1.1) 


  


  0.3x10^3/uL


(0.0-1.1)


 


Eosinophils # (Auto)


  0.1x10^3/uL


(0.0-0.7) 


  


  0.2x10^3/uL


(0.0-0.7)


 


Basophils # (Auto)


  0.0x10^3/uL


(0.0-0.2) 


  


  0.0x10^3/uL


(0.0-0.2)


 


Sodium Level


  138mmol/L


(136-145) 


  


  136mmol/L


(136-145)


 


Potassium Level


  4.1mmol/L


(3.5-5.1) 


  


  4.7mmol/L


(3.5-5.1)


 


Chloride Level


  100mmol/L


() 


  


  102mmol/L


()


 


Carbon Dioxide Level


  28mmol/L


(21-32) 


  


  27mmol/L


(21-32)


 


Anion Gap 10 (6-14)    7 (6-14) 


 


Blood Urea Nitrogen 12mg/dL (8-26)    8mg/dL (8-26) 


 


Creatinine


  0.8mg/dL


(0.7-1.3) 


  


  0.6mg/dL


(0.7-1.3)


 


Estimated GFR


(Cockcroft-Gault) 110.0 


  


  


  153.3 


 


 


BUN/Creatinine Ratio 15 (6-20)    


 


Glucose Level


  125mg/dL


(70-99) 


  


  268mg/dL


(70-99)


 


Calcium Level


  9.4mg/dL


(8.5-10.1) 


  


  8.8mg/dL


(8.5-10.1)


 


Total Bilirubin


  0.2mg/dL


(0.2-1.0) 


  


  


 


 


Aspartate Amino Transf


(AST/SGOT) 15U/L (15-37) 


  


  


  


 


 


Alanine Aminotransferase


(ALT/SGPT) 25U/L (16-63) 


  


  


  


 


 


Alkaline Phosphatase


  134U/L


() 


  


  


 


 


Total Protein


  7.9g/dL


(6.4-8.2) 


  


  


 


 


Albumin


  3.3g/dL


(3.4-5.0) 


  


  


 


 


Albumin/Globulin Ratio 0.7 (1.0-1.7)    


 


Lipase 68U/L ()    


 


Glucose (Fingerstick)


  


  138mg/dL


(70-99) 257mg/dL


(70-99) 


 














Test


  3/27/17


07:05 3/27/17


12:25 


  


 


 


Glucose (Fingerstick)


  199mg/dL


(70-99) 229mg/dL


(70-99) 


  


 








Laboratory Tests








Test


  3/26/17


17:23 3/26/17


23:21 3/27/17


05:20 3/27/17


07:05


 


Glucose (Fingerstick)


  138mg/dL


(70-99) 257mg/dL


(70-99) 


  199mg/dL


(70-99)


 


White Blood Count


  


  


  4.2x10^3/uL


(4.0-11.0) 


 


 


Red Blood Count


  


  


  4.35x10^6/uL


(4.30-5.70) 


 


 


Hemoglobin


  


  


  11.1g/dL


(13.0-17.5) 


 


 


Hematocrit


  


  


  34.2%


(39.0-53.0) 


 


 


Mean Corpuscular Volume   79fL ()  


 


Mean Corpuscular Hemoglobin   26pg (25-35)  


 


Mean Corpuscular Hemoglobin


Concent 


  


  32g/dL (31-37) 


  


 


 


Red Cell Distribution Width


  


  


  15.6%


(11.5-14.5) 


 


 


Platelet Count


  


  


  304x10^3/uL


(140-400) 


 


 


Neutrophils (%) (Auto)   51% (31-73)  


 


Lymphocytes (%) (Auto)   37% (24-48)  


 


Monocytes (%) (Auto)   8% (0-9)  


 


Eosinophils (%) (Auto)   4% (0-3)  


 


Basophils (%) (Auto)   1% (0-3)  


 


Neutrophils # (Auto)


  


  


  2.1x10^3uL


(1.8-7.7) 


 


 


Lymphocytes # (Auto)


  


  


  1.5x10^3/uL


(1.0-4.8) 


 


 


Monocytes # (Auto)


  


  


  0.3x10^3/uL


(0.0-1.1) 


 


 


Eosinophils # (Auto)


  


  


  0.2x10^3/uL


(0.0-0.7) 


 


 


Basophils # (Auto)


  


  


  0.0x10^3/uL


(0.0-0.2) 


 


 


Sodium Level


  


  


  136mmol/L


(136-145) 


 


 


Potassium Level


  


  


  4.7mmol/L


(3.5-5.1) 


 


 


Chloride Level


  


  


  102mmol/L


() 


 


 


Carbon Dioxide Level


  


  


  27mmol/L


(21-32) 


 


 


Anion Gap   7 (6-14)  


 


Blood Urea Nitrogen   8mg/dL (8-26)  


 


Creatinine


  


  


  0.6mg/dL


(0.7-1.3) 


 


 


Estimated GFR


(Cockcroft-Gault) 


  


  153.3 


  


 


 


Glucose Level


  


  


  268mg/dL


(70-99) 


 


 


Calcium Level


  


  


  8.8mg/dL


(8.5-10.1) 


 














Test


  3/27/17


12:25 


  


  


 


 


Glucose (Fingerstick)


  229mg/dL


(70-99) 


  


  


 








Medications





 Current Medications


Hydromorphone HCl (Dilaudid) 1 mg 1X  ONCE IV  Last administered on 3/26/17at 12

:52;  Start 3/26/17 at 12:15;  Stop 3/26/17 at 12:20;  Status DC


Ondansetron HCl 4 mg 4 mg 1X  ONCE IV  Last administered on 3/26/17at 12:46;  

Start 3/26/17 at 12:15;  Stop 3/26/17 at 12:20;  Status DC


Sodium Chloride (Iv Sodium Chloride 0.9% 1000ml Bag) 1,000 ml @  1,000 mls/hr 

1X  ONCE IV  Last administered on 3/26/17at 12:38;  Start 3/26/17 at 12:15;  

Stop 3/26/17 at 13:14;  Status DC


Pantoprazole Sodium 80 mg 80 mg 1X  ONCE IVP  Last administered on 3/26/17at 12:

48;  Start 3/26/17 at 12:15;  Stop 3/26/17 at 12:20;  Status DC


Pantoprazole Sodium/Sodium Chloride (Protonix Iv/Iv Sodium Chloride 0.9% 100ml) 

100 ml @  10 mls/hr 1X  ONCE IV  Last administered on 3/26/17at 12:52;  Start 3/

26/17 at 12:15;  Stop 3/26/17 at 22:14;  Status DC


Iohexol (Omnipaque 300 Mg/ml) 75 ml 1X  ONCE IV ;  Start 3/26/17 at 12:30;  

Stop 3/26/17 at 12:31;  Status DC


Ondansetron HCl (Zofran) 4 mg PRN Q8HRS  PRN IV NAUSEA/VOMITING;  Start 3/26/17 

at 13:30;  Stop 3/26/17 at 14:07;  Status DC


Fentanyl Citrate 50 mcg 50 mcg PRN Q2HR  PRN IV PAIN;  Start 3/26/17 at 13:30;  

Stop 3/27/17 at 13:29;  Status UNV


Sodium Chloride (Iv Sodium Chloride 0.9% 1000ml Bag) 1,000 ml @  125 mls/hr Q8H 

IV  Last administered on 3/27/17at 03:50;  Start 3/26/17 at 13:19;  Stop 3/27/

17 at 13:18;  Status DC


Hydromorphone HCl (Dilaudid) 1 mg 1X  ONCE IV  Last administered on 3/26/17at 13

:45;  Start 3/26/17 at 13:30;  Stop 3/26/17 at 13:31;  Status DC


Hydromorphone HCl (Dilaudid) 1 mg PRN Q2HR  PRN IV PAIN SEVERE Last 

administered on 3/27/17at 15:18;  Start 3/26/17 at 13:30


Ondansetron HCl (Zofran) 4 mg PRN Q6HRS  PRN IV NAUSEA/VOMITING;  Start 3/26/17 

at 14:04


Acetaminophen (Tylenol) 500 mg PRN Q6HRS  PRN PO MILD PAIN / TEMP;  Start 3/26/

17 at 14:15


Pantoprazole Sodium (Protonix Vial) 40 mg BID IVP  Last administered on 3/27/

17at 08:20;  Start 3/26/17 at 21:00


Clonazepam (Klonopin) 1 mg TID PO  Last administered on 3/27/17at 15:19;  Start 

3/26/17 at 21:00


Non-Formulary Medication 30 mg BID PO ADHD;  Start 3/26/17 at 21:00;  Status UNV


Gabapentin (Neurontin) 600 mg TID PO  Last administered on 3/27/17at 15:19;  

Start 3/26/17 at 21:00


Oxycodone HCl (Roxicodone) 10 mg PRN Q4HRS  PRN PO PAIN SEVERE;  Start 3/26/17 

at 14:30


Insulin Aspart (Novolog) 0-7 UNITS TIDWMEALS SQ ;  Start 3/26/17 at 17:00


Dextrose 12.5 gm PRN Q15MIN  PRN IV SEE COMMENTS;  Start 3/26/17 at 14:15


Nicotine Polacrilex 1 each 1 each PRN Q1HR  PRN BC SMOKING CESSATION Last 

administered on 3/27/17at 15:19;  Start 3/26/17 at 22:30


Lactated Ringer's 1,000 ml @  50 mls/hr Q20H IV  Last administered on 3/27/17at 

13:45;  Start 3/27/17 at 10:45;  Stop 3/27/17 at 10:46;  Status DC


Propofol (Diprivan) 40 ml @ As Directed STK-MED ONCE IV ;  Start 3/27/17 at 13:

43;  Stop 3/27/17 at 13:44;  Status DC


Lidocaine HCl (Lidocaine Pf 2% Vial) 5 ml STK-MED ONCE .ROUTE ;  Start 3/27/17 

at 13:43;  Stop 3/27/17 at 13:44;  Status DC


Midazolam HCl (Versed) 2 mg PRN 1X  PRN IV PRIOR TO PROCEDURE;  Start 3/27/17 

at 13:45;  Stop 3/28/17 at 13:44


Fentanyl Citrate (Fentanyl 2ml Vial) 25 mcg PRN Q5MIN  PRN IV X 2 DOSES FOR PAIN

;  Start 3/27/17 at 13:45;  Stop 3/27/17 at 13:51;  Status DC


Fentanyl Citrate 50 mcg 50 mcg PRN Q5MIN  PRN IV X 2 DOSES FOR PAIN;  Start 3/27

/17 at 13:45;  Stop 3/27/17 at 13:51;  Status DC


Lactated Ringer's (Iv Lactated Ringers) 1,000 ml @  125 mls/hr Q8H IV ;  Start 3

/27/17 at 13:43;  Stop 3/28/17 at 01:42


Lidocaine HCl 2 ml 1X PRN  PRN ID IV START;  Start 3/27/17 at 13:45;  Stop 3/28/

17 at 13:44


Metoclopramide HCl (Reglan) 5 mg Q6HRS IV ;  Start 3/27/17 at 18:00


Ondansetron HCl (Zofran) 4 mg PRN Q6HRS  PRN IV Nausea;  Start 3/28/17 at 07:00

;  Stop 3/29/17 at 06:59


Morphine Sulfate 1 mg 1 mg PRN Q10MIN  PRN IV SEVERE PAIN;  Start 3/28/17 at 07:

00;  Stop 3/29/17 at 06:59


Lactated Ringer's (Iv Lactated Ringers) 1,000 ml @  0 mls/hr Q0M IV ;  Start 3/

28/17 at 07:00;  Stop 3/28/17 at 18:59


Lidocaine HCl 2 ml 1X PRN  PRN ID IV START;  Start 3/28/17 at 07:00;  Stop 3/29/

17 at 06:59


Hydromorphone HCl (Dilaudid) 0.5 mg PRN Q10MIN  PRN IV SEVERE PAIN, Second 

choice;  Start 3/28/17 at 07:00;  Stop 3/29/17 at 06:59





Active Scripts


Active


Oxycodone Hcl 10 Mg Tablet 1 Tab PO Q4-6HRS PRN


Clonazepam 1 Mg Tablet 1 Tab PO TID


Reported


Novolog Flexpen (Insulin Aspart) 100 Unit/1 Ml Insuln.pen 20 Unit SQ TIDAC


Gabapentin 600 Mg Tablet 600 Mg PO TID


Lantus Solostar (Insulin Glargine,Hum.rec.anlog) 100 Unit/1 Ml Insuln.pen 80 

Unit SQ QHS


Adderall 30 Mg Tablet (Dextroamphetamine/Amphetamine) 30 Mg Tablet 30 Mg PO BID


Metformin Hcl 1,000 Mg Tablet 1,000 Mg PO BID


Vitals/I & O





 Vital Sign - Last 24 Hours








 3/26/17 3/26/17 3/26/17 3/26/17





 17:10 19:00 19:45 22:14


 


Temp  97.9  





  97.9  


 


Pulse  104  


 


Resp 20 18 16 16


 


B/P  122/80  


 


Pulse Ox 93 100 93 93


 


O2 Delivery Room Air  Room Air Room Air


 


    





    





 3/26/17 3/27/17 3/27/17 3/27/17





 23:00 00:33 02:46 05:24


 


Temp 97.9   





 97.9   


 


Pulse 101   


 


Resp 16 15 16 18


 


B/P 122/85   


 


Pulse Ox 100 100 100 100


 


O2 Delivery  Room Air Room Air Room Air


 


    





    





 3/27/17 3/27/17 3/27/17 3/27/17





 05:54 07:00 08:00 08:19


 


Temp  97.6  





  97.6  


 


Pulse  97  


 


Resp 17 20  


 


B/P  123/84  


 


Pulse Ox 100 98  


 


O2 Delivery  Room Air Room Air Room Air


 


    





    





 3/27/17 3/27/17 3/27/17 3/27/17





 11:00 12:23 13:00 13:47


 


Temp 97.5   





 97.5   


 


Pulse 101   


 


Resp 20   


 


B/P 116/81   


 


Pulse Ox 96   


 


O2 Delivery Room Air Room Air Room Air Room Air


 


    





    





 3/27/17 3/27/17 3/27/17 3/27/17





 14:26 14:34 14:47 15:00


 


Temp 97.6 97.6 97.6 97.3





 97.6 97.6 97.6 97.3


 


Pulse 89 91 91 98


 


Resp 20 20 20 20


 


B/P 99/56 107/58 128/65 124/80


 


Pulse Ox 100 100 100 99


 


O2 Delivery Nasal Cannula Nasal Cannula Room Air Room Air


 


O2 Flow Rate 2 2  


 


    





    





 3/27/17   





 15:18   


 


O2 Delivery Room Air   














 Intake and Output   


 


 3/26/17 3/26/17 3/27/17





 15:00 23:00 07:00


 


Intake Total  0 ml 


 


Output Total  1050 ml 


 


Balance  -1050 ml 














ANDERS SEYMOUR MD Mar 27, 2017 16:37

## 2017-03-27 NOTE — CONS
DATE OF CONSULTATION:  03/26/2017



Dr. Nora Solis dictating a GI consultation for Dr. Brijesh Yi.  I am

covering for him today.



REQUESTING PHYSICIAN:  Dr. Mendez.



PRIMARY CARE PHYSICIAN:  None.



REASON FOR CONSULTATION:  Hematemesis.



HISTORY OF PRESENT ILLNESS:  This is a 36-year-old gentleman with a past medical

history of Crohn's disease, who came in for coffee-ground emesis.  He states

that he has been taking ibuprofen pills like they were Tic Tac for shoulder pain

after he had shoulder fracture in 12/2016.  He reports that he did not know that

he was not supposed to take NSAIDs with a diagnosis of Crohn's.  He denies any

red blood in his emesis.  He was most recently on Humira 6 months ago, but has

not been taking any medications and has not been following with a

gastroenterologist.  Today, he states he has diffuse abdominal pain.  He is

having 2-3 normal bowel movements a day without any blood.  He reports that his

last EGD was 5 years ago and was unrevealing.



PAST MEDICAL HISTORY:

1.  Diverticulitis with abscess, we are planning percutaneous drainage.

2.  Pancreatitis related to Depakote use.

3.  Two previous colonoscopies in 2007, after divert[place] in [place] at which time he was told he had diverticulosis and Crohn's disease.

4.  No treatment for Crohn's disease except steroids and hospital admission.

5.  In 2014, he had a gastric emptying scan that showed marked delay with a

T-1/2 of 315 minutes with a history of uncontrolled diabetes.

6.  He had a borderline positive anti saccharomyces cerevisiae antibody in 2015.



IMAGING STUDIES:  CT in 01/2017 showed mesenteric panniculitis, mild prominent

mesenteric retroperitoneal lymph nodes, diverticulosis, and no evidence of

Crohn's disease.



FAMILY MEDICAL HISTORY:  Denies colon cancer.



SOCIAL HISTORY:  He uses marijuana, but otherwise denies tobacco or IV drug

abuse.



HOME MEDICATIONS:  Include:

1.  Clonazepam.

2.  Adderall.

3.  Gabapentin.

4.  Insulin.

5.  Metformin.

6.  Oxycodone.



ALLERGIES:

1.  FENTANYL.

2.  HALOPERIDOL.

3.  LITHIUM.

4.  KETOROLAC.



REVIEW OF SYSTEMS:  A 13-point review of systems was done.  It is positive as

per HPI and otherwise negative.



PHYSICAL EXAMINATION:

VITAL SIGNS:  Temperature not done, blood pressure is 108/67, heart rate 106.

GENERAL:  He is a well-developed, well-nourished  male in no apparent

distress.

HEENT:  His oropharynx is clear.

CARDIOVASCULAR:  S1, S2.

LUNGS:  Clear.

ABDOMEN:  Normoactive bowel sounds, soft, diffusely tender to palpation.

EXTREMITIES:  No edema.

SKIN:  He does have tattoo on his chest and arms.



LABORATORY DATA:  White blood cell count of 5.2 with hemoglobin of 11.5 and MCV

at 79, platelets are 290.  Chemistries show an elevated alkaline phosphatase of

134, low lipase at 68.  CT of the abdomen and pelvis shows some atelectasis, but

occasional diverticula on the large bowel, but no evidence of inflammation.



ASSESSMENT AND PLAN:

1.  Hematemesis:  Concern for gastritis versus peptic ulcer with his admitted

increased use of ibuprofen.  I will start him on the Protonix drip and hold any

ibuprofen.  I have reviewed his laboratory values, and his hemoglobin was 12.2

on 03/06/2017.  Prior to that, he was in the 11 range.  It does not appear that

he has had a precipitous drop.  We will continue to monitor this in-house.

2.  Crohn's disease:  He reports that he has not been on any medication in the

last 6 months.  He has received IV steroids during prior in-house admissions.  I

would favor he has an outpatient gastroenterologist so that his disease may be

monitored closely.  He likely needs a colonoscopy at some point; this can be

done as an outpatient.



Thank you for allowing me and Dr. Yi to participate in the care of this

patient.

 



______________________________

NORA SOLIS MD



DR:  BHARTI/christian  JOB#:  [id number] / [id number]

DD:  03/26/2017 15:37  DT:  03/26/2017 23:58



AL Barnhart MD, MICHAEL MD

## 2017-03-27 NOTE — PDOC
Subjective:


Subjective:


Abd pain - can't really describe.


No recurrent hematemesis.





Objective:


Objective:


Per RN - two juices about 1 hour ago, non-compliant w/ NPO.


Vital Signs:





 Vital Signs








  Date Time  Temp Pulse Resp B/P Pulse Ox O2 Delivery O2 Flow Rate FiO2


 


3/27/17 08:50      Room Air  


 


3/27/17 07:00 97.6 97 20 123/84 98   





 97.6       








Labs:





 Laboratory Tests








Test


  3/26/17


13:08 3/26/17


17:23 3/26/17


23:21 3/27/17


05:20


 


White Blood Count 5.2x10^3/uL    4.2x10^3/uL 


 


Red Blood Count 4.56x10^6/uL    4.35x10^6/uL 


 


Hemoglobin 11.5g/dL    11.1g/dL 


 


Hematocrit 36.1%    34.2% 


 


Mean Corpuscular Volume 79fL    79fL 


 


Mean Corpuscular Hemoglobin 25pg    26pg 


 


Mean Corpuscular Hemoglobin


Concent 32g/dL 


  


  


  32g/dL 


 


 


Red Cell Distribution Width 15.4%    15.6% 


 


Platelet Count 290x10^3/uL    304x10^3/uL 


 


Neutrophils (%) (Auto) 68%    51% 


 


Lymphocytes (%) (Auto) 22%    37% 


 


Monocytes (%) (Auto) 7%    8% 


 


Eosinophils (%) (Auto) 2%    4% 


 


Basophils (%) (Auto) 1%    1% 


 


Neutrophils # (Auto) 3.5x10^3uL    2.1x10^3uL 


 


Lymphocytes # (Auto) 1.1x10^3/uL    1.5x10^3/uL 


 


Monocytes # (Auto) 0.4x10^3/uL    0.3x10^3/uL 


 


Eosinophils # (Auto) 0.1x10^3/uL    0.2x10^3/uL 


 


Basophils # (Auto) 0.0x10^3/uL    0.0x10^3/uL 


 


Sodium Level 138mmol/L    136mmol/L 


 


Potassium Level 4.1mmol/L    4.7mmol/L 


 


Chloride Level 100mmol/L    102mmol/L 


 


Carbon Dioxide Level 28mmol/L    27mmol/L 


 


Anion Gap 10    7 


 


Blood Urea Nitrogen 12mg/dL    8mg/dL 


 


Creatinine 0.8mg/dL    0.6mg/dL 


 


Estimated GFR


(Cockcroft-Gault) 110.0 


  


  


  153.3 


 


 


BUN/Creatinine Ratio 15    


 


Glucose Level 125mg/dL    268mg/dL 


 


Calcium Level 9.4mg/dL    8.8mg/dL 


 


Total Bilirubin 0.2mg/dL    


 


Aspartate Amino Transf


(AST/SGOT) 15U/L 


  


  


  


 


 


Alanine Aminotransferase


(ALT/SGPT) 25U/L 


  


  


  


 


 


Alkaline Phosphatase 134U/L    


 


Total Protein 7.9g/dL    


 


Albumin 3.3g/dL    


 


Albumin/Globulin Ratio 0.7    


 


Lipase 68U/L    


 


Glucose (Fingerstick)  138mg/dL  257mg/dL  














Test


  3/27/17


07:05 


  


  


 


 


Glucose (Fingerstick) 199mg/dL    











PE:





GEN: NAD


LUNGS: CTAB


HEART: RRR


ABD: NABS, S/ND/NT


NEURO/PSYCH: A & O 3





A/P:


Hematemesis, abd pain





--


NPO orders but pt drinking juice.  Agrees to EGD this afternoon, also agrees to 

strict NPO now.


D/w Dr. Yi, d/w GI lab.








ROSINA CERRATO Mar 27, 2017 09:51

## 2017-03-28 NOTE — PDOC
PROGRESS NOTES


Chief Complaint


Chief Complaint


hematemesis


Abd pain








ASSESSMENT AND PLAN:


1.  Hematemesis:  prob NSAIDs induced.  appreciate GI service input: s/p  EGD 

and colonoscopy today


2.  Crohn's:  hx of Humira use, steroids for flares


3.  Gastroparesis:  most likely 2/2 DM.  Reglan bid


4.  DM: poorly controlled with holding of home lantus/reg insulin.  restart, 

lower dose


5.  Shoulder pain:  chronic.  gets refills with admit, missed pain clinic eval 

x2 2/2 admissions here.  narcotic dependent.  








Vitals


Vitals





 Vital Signs








  Date Time  Temp Pulse Resp B/P Pulse Ox O2 Delivery O2 Flow Rate FiO2


 


3/28/17 07:00 97.7 98 18 111/73 95 Room Air  





 97.7       


 


3/27/17 14:34       2 











Physical Exam


General:  Alert, Oriented X3, Cooperative, No acute distress


Heart:  Regular rate, Normal S1, Normal S2


Lungs:  Clear


Abdomen:  Normal bowel sounds, Soft, No tenderness


Extremities:  No clubbing


Skin:  No rashes





Labs


LABS





Laboratory Tests








Test


  3/27/17


12:25 3/27/17


16:55 3/27/17


21:12


 


Glucose (Fingerstick)


  229mg/dL


(70-99) 231mg/dL


(70-99) 474mg/dL


(70-99)











Assessment and Plan


Assessmemt and Plan


 Problems


Medical Problems:


(1) Abdominal pain


Status: Acute  





(2) Coffee ground emesis


Status: Acute  








Problems:  





Comment


Review of Relevant


I have reviewed the following items keegan (where applicable) has been applied.


Labs





Laboratory Tests








Test


  3/26/17


13:08 3/26/17


17:23 3/26/17


23:21 3/27/17


05:20


 


White Blood Count


  5.2x10^3/uL


(4.0-11.0) 


  


  4.2x10^3/uL


(4.0-11.0)


 


Red Blood Count


  4.56x10^6/uL


(4.30-5.70) 


  


  4.35x10^6/uL


(4.30-5.70)


 


Hemoglobin


  11.5g/dL


(13.0-17.5) 


  


  11.1g/dL


(13.0-17.5)


 


Hematocrit


  36.1%


(39.0-53.0) 


  


  34.2%


(39.0-53.0)


 


Mean Corpuscular Volume 79fL ()    79fL () 


 


Mean Corpuscular Hemoglobin 25pg (25-35)    26pg (25-35) 


 


Mean Corpuscular Hemoglobin


Concent 32g/dL (31-37) 


  


  


  32g/dL (31-37) 


 


 


Red Cell Distribution Width


  15.4%


(11.5-14.5) 


  


  15.6%


(11.5-14.5)


 


Platelet Count


  290x10^3/uL


(140-400) 


  


  304x10^3/uL


(140-400)


 


Neutrophils (%) (Auto) 68% (31-73)    51% (31-73) 


 


Lymphocytes (%) (Auto) 22% (24-48)    37% (24-48) 


 


Monocytes (%) (Auto) 7% (0-9)    8% (0-9) 


 


Eosinophils (%) (Auto) 2% (0-3)    4% (0-3) 


 


Basophils (%) (Auto) 1% (0-3)    1% (0-3) 


 


Neutrophils # (Auto)


  3.5x10^3uL


(1.8-7.7) 


  


  2.1x10^3uL


(1.8-7.7)


 


Lymphocytes # (Auto)


  1.1x10^3/uL


(1.0-4.8) 


  


  1.5x10^3/uL


(1.0-4.8)


 


Monocytes # (Auto)


  0.4x10^3/uL


(0.0-1.1) 


  


  0.3x10^3/uL


(0.0-1.1)


 


Eosinophils # (Auto)


  0.1x10^3/uL


(0.0-0.7) 


  


  0.2x10^3/uL


(0.0-0.7)


 


Basophils # (Auto)


  0.0x10^3/uL


(0.0-0.2) 


  


  0.0x10^3/uL


(0.0-0.2)


 


Sodium Level


  138mmol/L


(136-145) 


  


  136mmol/L


(136-145)


 


Potassium Level


  4.1mmol/L


(3.5-5.1) 


  


  4.7mmol/L


(3.5-5.1)


 


Chloride Level


  100mmol/L


() 


  


  102mmol/L


()


 


Carbon Dioxide Level


  28mmol/L


(21-32) 


  


  27mmol/L


(21-32)


 


Anion Gap 10 (6-14)    7 (6-14) 


 


Blood Urea Nitrogen 12mg/dL (8-26)    8mg/dL (8-26) 


 


Creatinine


  0.8mg/dL


(0.7-1.3) 


  


  0.6mg/dL


(0.7-1.3)


 


Estimated GFR


(Cockcroft-Gault) 110.0 


  


  


  153.3 


 


 


BUN/Creatinine Ratio 15 (6-20)    


 


Glucose Level


  125mg/dL


(70-99) 


  


  268mg/dL


(70-99)


 


Calcium Level


  9.4mg/dL


(8.5-10.1) 


  


  8.8mg/dL


(8.5-10.1)


 


Total Bilirubin


  0.2mg/dL


(0.2-1.0) 


  


  


 


 


Aspartate Amino Transf


(AST/SGOT) 15U/L (15-37) 


  


  


  


 


 


Alanine Aminotransferase


(ALT/SGPT) 25U/L (16-63) 


  


  


  


 


 


Alkaline Phosphatase


  134U/L


() 


  


  


 


 


Total Protein


  7.9g/dL


(6.4-8.2) 


  


  


 


 


Albumin


  3.3g/dL


(3.4-5.0) 


  


  


 


 


Albumin/Globulin Ratio 0.7 (1.0-1.7)    


 


Lipase 68U/L ()    


 


Glucose (Fingerstick)


  


  138mg/dL


(70-99) 257mg/dL


(70-99) 


 














Test


  3/27/17


07:05 3/27/17


12:25 3/27/17


16:55 3/27/17


21:12


 


Glucose (Fingerstick)


  199mg/dL


(70-99) 229mg/dL


(70-99) 231mg/dL


(70-99) 474mg/dL


(70-99)








Laboratory Tests








Test


  3/27/17


12:25 3/27/17


16:55 3/27/17


21:12


 


Glucose (Fingerstick)


  229mg/dL


(70-99) 231mg/dL


(70-99) 474mg/dL


(70-99)








Medications





 Current Medications


Hydromorphone HCl (Dilaudid) 1 mg 1X  ONCE IV  Last administered on 3/26/17at 12

:52;  Start 3/26/17 at 12:15;  Stop 3/26/17 at 12:20;  Status DC


Ondansetron HCl 4 mg 4 mg 1X  ONCE IV  Last administered on 3/26/17at 12:46;  

Start 3/26/17 at 12:15;  Stop 3/26/17 at 12:20;  Status DC


Sodium Chloride (Iv Sodium Chloride 0.9% 1000ml Bag) 1,000 ml @  1,000 mls/hr 

1X  ONCE IV  Last administered on 3/26/17at 12:38;  Start 3/26/17 at 12:15;  

Stop 3/26/17 at 13:14;  Status DC


Pantoprazole Sodium 80 mg 80 mg 1X  ONCE IVP  Last administered on 3/26/17at 12:

48;  Start 3/26/17 at 12:15;  Stop 3/26/17 at 12:20;  Status DC


Pantoprazole Sodium/Sodium Chloride (Protonix Iv/Iv Sodium Chloride 0.9% 100ml) 

100 ml @  10 mls/hr 1X  ONCE IV  Last administered on 3/26/17at 12:52;  Start 3/

26/17 at 12:15;  Stop 3/26/17 at 22:14;  Status DC


Iohexol (Omnipaque 300 Mg/ml) 75 ml 1X  ONCE IV ;  Start 3/26/17 at 12:30;  

Stop 3/26/17 at 12:31;  Status DC


Ondansetron HCl (Zofran) 4 mg PRN Q8HRS  PRN IV NAUSEA/VOMITING;  Start 3/26/17 

at 13:30;  Stop 3/26/17 at 14:07;  Status DC


Fentanyl Citrate 50 mcg 50 mcg PRN Q2HR  PRN IV PAIN;  Start 3/26/17 at 13:30;  

Stop 3/27/17 at 13:29;  Status UNV


Sodium Chloride (Iv Sodium Chloride 0.9% 1000ml Bag) 1,000 ml @  125 mls/hr Q8H 

IV  Last administered on 3/27/17at 03:50;  Start 3/26/17 at 13:19;  Stop 3/27/

17 at 13:18;  Status DC


Hydromorphone HCl (Dilaudid) 1 mg 1X  ONCE IV  Last administered on 3/26/17at 13

:45;  Start 3/26/17 at 13:30;  Stop 3/26/17 at 13:31;  Status DC


Hydromorphone HCl (Dilaudid) 1 mg PRN Q2HR  PRN IV PAIN SEVERE Last 

administered on 3/27/17at 15:18;  Start 3/26/17 at 13:30;  Stop 3/27/17 at 16:37

;  Status DC


Ondansetron HCl (Zofran) 4 mg PRN Q6HRS  PRN IV NAUSEA/VOMITING;  Start 3/26/17 

at 14:04


Acetaminophen (Tylenol) 500 mg PRN Q6HRS  PRN PO MILD PAIN / TEMP;  Start 3/26/

17 at 14:15


Pantoprazole Sodium (Protonix Vial) 40 mg BID IVP  Last administered on 3/27/

17at 08:20;  Start 3/26/17 at 21:00;  Stop 3/27/17 at 18:15;  Status DC


Clonazepam (Klonopin) 1 mg TID PO  Last administered on 3/27/17at 20:50;  Start 

3/26/17 at 21:00


Non-Formulary Medication 30 mg BID PO ADHD;  Start 3/26/17 at 21:00;  Status UNV


Gabapentin (Neurontin) 600 mg TID PO  Last administered on 3/27/17at 20:45;  

Start 3/26/17 at 21:00


Oxycodone HCl (Roxicodone) 10 mg PRN Q4HRS  PRN PO PAIN SEVERE;  Start 3/26/17 

at 14:30;  Stop 3/27/17 at 16:37;  Status DC


Insulin Aspart (Novolog) 0-7 UNITS TIDWMEALS SQ ;  Start 3/26/17 at 17:00


Dextrose 12.5 gm PRN Q15MIN  PRN IV SEE COMMENTS;  Start 3/26/17 at 14:15


Nicotine Polacrilex 1 each 1 each PRN Q1HR  PRN BC SMOKING CESSATION Last 

administered on 3/28/17at 06:35;  Start 3/26/17 at 22:30


Lactated Ringer's 1,000 ml @  50 mls/hr Q20H IV  Last administered on 3/27/17at 

13:45;  Start 3/27/17 at 10:45;  Stop 3/27/17 at 10:46;  Status DC


Propofol (Diprivan) 40 ml @ As Directed STK-MED ONCE IV ;  Start 3/27/17 at 13:

43;  Stop 3/27/17 at 13:44;  Status DC


Lidocaine HCl (Lidocaine Pf 2% Vial) 5 ml Lumicity-MED ONCE .ROUTE ;  Start 3/27/17 

at 13:43;  Stop 3/27/17 at 13:44;  Status DC


Midazolam HCl (Versed) 2 mg PRN 1X  PRN IV PRIOR TO PROCEDURE;  Start 3/27/17 

at 13:45;  Stop 3/28/17 at 13:44


Fentanyl Citrate (Fentanyl 2ml Vial) 25 mcg PRN Q5MIN  PRN IV X 2 DOSES FOR PAIN

;  Start 3/27/17 at 13:45;  Stop 3/27/17 at 13:51;  Status DC


Fentanyl Citrate 50 mcg 50 mcg PRN Q5MIN  PRN IV X 2 DOSES FOR PAIN;  Start 3/27

/17 at 13:45;  Stop 3/27/17 at 13:51;  Status DC


Lactated Ringer's (Iv Lactated Ringers) 1,000 ml @  125 mls/hr Q8H IV ;  Start 3

/27/17 at 13:43;  Stop 3/28/17 at 01:42;  Status DC


Lidocaine HCl 2 ml 1X PRN  PRN ID IV START;  Start 3/27/17 at 13:45;  Stop 3/28/

17 at 13:44


Metoclopramide HCl (Reglan) 5 mg Q6HRS IV  Last administered on 3/28/17at 06:35

;  Start 3/27/17 at 18:00


Ondansetron HCl (Zofran) 4 mg PRN Q6HRS  PRN IV Nausea;  Start 3/28/17 at 07:00

;  Stop 3/29/17 at 06:59


Morphine Sulfate 1 mg 1 mg PRN Q10MIN  PRN IV SEVERE PAIN;  Start 3/28/17 at 07:

00;  Stop 3/28/17 at 07:00;  Status DC


Lactated Ringer's (Iv Lactated Ringers) 1,000 ml @  0 mls/hr Q0M IV ;  Start 3/

28/17 at 07:00;  Stop 3/28/17 at 18:59


Lidocaine HCl 2 ml 1X PRN  PRN ID IV START;  Start 3/28/17 at 07:00;  Stop 3/29/

17 at 06:59


Hydromorphone HCl (Dilaudid) 0.5 mg PRN Q10MIN  PRN IV SEVERE PAIN, Second 

choice;  Start 3/28/17 at 07:00;  Stop 3/29/17 at 06:59


Insulin Detemir (Levemir) 80 units QHS SQ ;  Start 3/27/17 at 21:00;  Stop 3/27/

17 at 21:00;  Status DC


Insulin Aspart (Novolog) 20 units TIDAC SQ ;  Start 3/28/17 at 17:00


Hydromorphone HCl (Dilaudid) 2 mg PRN Q4HRS  PRN PO PAIN Last administered on 3/

28/17at 03:25;  Start 3/27/17 at 16:45


Magnesium Citrate (Citroma) 296 ml 1X  ONCE PO  Last administered on 3/27/17at 

17:30;  Start 3/27/17 at 17:00;  Stop 3/27/17 at 17:04;  Status DC


Polyethylene Glycol (miraLAX Powder BULK BOTTLE) 238 gm 1X  ONCE PO  Last 

administered on 3/27/17at 18:38;  Start 3/27/17 at 17:00;  Stop 3/27/17 at 17:04

;  Status DC


Bisacodyl (Dulcolax Tab) 15 mg 1X  ONCE PO ;  Start 3/27/17 at 17:00;  Stop 3/27

/17 at 17:04;  Status DC


Insulin Detemir (Levemir) 40 units QHS SQ ;  Start 3/27/17 at 21:00


Pantoprazole Sodium (Protonix) 40 mg DAILYAC PO ;  Start 3/28/17 at 07:30


Insulin Human Regular (Novolin R Vial) 20 unit 1X  ONCE IV ;  Start 3/27/17 at 

22:45;  Stop 3/27/17 at 22:46;  Status Cancel


Bisacodyl (Dulcolax Tab) 15 mg 1X  ONCE PO  Last administered on 3/27/17at 23:04

;  Start 3/27/17 at 23:00;  Stop 3/27/17 at 23:01;  Status DC


Insulin Aspart (Novolog) 20 units 1X  ONCE SQ  Last administered on 3/27/17at 23

:09;  Start 3/27/17 at 23:00;  Stop 3/27/17 at 23:01;  Status DC


Magnesium Citrate (Citroma) 296 ml 1X  ONCE PO ;  Start 3/28/17 at 08:15;  Stop 

3/28/17 at 08:16;  Status DC


Bisacodyl (Dulcolax Tab) 40 mg 1X  ONCE PO ;  Start 3/28/17 at 08:15;  Stop 3/28

/17 at 08:16;  Status UNV





Active Scripts


Active


Oxycodone Hcl 10 Mg Tablet 1 Tab PO Q4-6HRS PRN


Clonazepam 1 Mg Tablet 1 Tab PO TID


Reported


Novolog Flexpen (Insulin Aspart) 100 Unit/1 Ml Insuln.pen 20 Unit SQ TIDAC


Gabapentin 600 Mg Tablet 600 Mg PO TID


Lantus Solostar (Insulin Glargine,Hum.rec.anlog) 100 Unit/1 Ml Insuln.pen 80 

Unit SQ QHS


Adderall 30 Mg Tablet (Dextroamphetamine/Amphetamine) 30 Mg Tablet 30 Mg PO BID


Metformin Hcl 1,000 Mg Tablet 1,000 Mg PO BID


Vitals/I & O





 Vital Sign - Last 24 Hours








 3/27/17 3/27/17 3/27/17 3/27/17





 11:00 12:23 13:00 13:47


 


Temp 97.5   





 97.5   


 


Pulse 101   


 


Resp 20   


 


B/P 116/81   


 


Pulse Ox 96   


 


O2 Delivery Room Air Room Air Room Air Room Air


 


    





    





 3/27/17 3/27/17 3/27/17 3/27/17





 14:26 14:34 14:47 15:00


 


Temp 97.6 97.6 97.6 97.3





 97.6 97.6 97.6 97.3


 


Pulse 89 91 91 98


 


Resp 20 20 20 20


 


B/P 99/56 107/58 128/65 124/80


 


Pulse Ox 100 100 100 99


 


O2 Delivery Nasal Cannula Nasal Cannula Room Air Room Air


 


O2 Flow Rate 2 2  


 


    





    





 3/27/17 3/27/17 3/27/17 3/27/17





 15:18 17:28 19:44 20:08


 


Pulse   100 


 


Resp   18 


 


B/P   114/79 


 


Pulse Ox   99 


 


O2 Delivery Room Air Room Air Room Air Room Air





 3/27/17 3/27/17 3/28/17 3/28/17





 21:34 22:35 02:56 03:25


 


Temp   98.2 





   98.2 


 


Pulse   116 


 


Resp   20 18


 


B/P   140/69 


 


Pulse Ox 99 99 97 99


 


O2 Delivery Room Air  Room Air Room Air


 


    





    





 3/28/17 3/28/17  





 04:25 07:00  


 


Temp  97.7  





  97.7  


 


Pulse  98  


 


Resp 17 18  


 


B/P  111/73  


 


Pulse Ox  95  


 


O2 Delivery Room Air Room Air  














 Intake and Output   


 


 3/27/17 3/27/17 3/28/17





 15:00 23:00 07:00


 


Intake Total 300 ml  650 ml


 


Output Total 550 ml 4 ml 


 


Balance -250 ml -4 ml 650 ml














KELSEAIREDSUZAN HUERTA MD Mar 28, 2017 08:22

## 2017-03-28 NOTE — PDOC4
PROCEDURE


Procedure


Colonoscopy/ileoscopy





Indication: r/o IBD, specifically Crohn's disease





Meds: per anesthesia





Findings:





RAMESH: normal





Prep poor with areas of liquid opaque stool, though large areas were fairly well

-seen.  The terminal ileum was well-seen and normal for several centimeters 

proximal to IC valve.


What mucosa seen in the colon was perfectly normal.


His known diverticulosis could not be appreciated due the presence of opaque 

stool in the sigmoid.


Small internal hemorrhoids were seen.





Tolerated well.





IMP:  Known diverticulosis; not seen.


         Small hemorrhoids.


         NO evidence for inflammatory bowel disease.





REC: Advance diet as tolerated.


         Home from my standpoint at your discretion.








KAROL MARTELL MD Mar 28, 2017 13:30

## 2017-03-29 NOTE — PATHOLOGY
PATHOLOGY REPORT 

 

*    *    *    *    *    *    *    * 

 FINAL DIAGNOSIS:

Gastric biopsy, antrum:

- Chronic gastritis, mild.

 

COMMENT:

Sections of the gastric antral biopsy show congestion and mild

chronic inflammation.  An immunoperoxidase stain for Helicobacter is

obtained.  No Helicobacter organisms are identified.  There is no

evidence of malignancy. 

(JPM:; d/t: 3/29/17) 

Special Stain Performed:  Immunoperoxidase stain for Helicobacter

(A1) 

 

 

REPORT ELECTRONICALLY SIGNED BY:   Bob Frye M.D.

DATE/TIME:   3/29/2017 10:54

*    *    *    *    *    *    *    *

 

GROSS PATHOLOGY:

Received in formalin labeled "River Yoon Jr., antral biopsy,"

are two segments of tan soft tissue measuring 0.6 x 0.4 x 0.1 cm in

aggregate dimensions and ranging from 0.2 to 0.6 cm in maximum

dimension.  The specimen is submitted entirely in cassette A1.

(CAA; 3/28/2017)

 

 

 INITIAL CPT CODE(S):

A; 24852, 03590

Professional services performed by [facility] [place] 

[place] [address]

 

  Technical services performed by [facility] at [address] [place] [address].

  

 SPECIMEN(S) RECEIVED:

A.Antral biopsy

 

 CLINICAL HISTORY:

Upper GI bleed

 

 PATIENT:  RIVER YOON JR

/AGE:  1980 (Age: 36)  

PATIENT #:  [id number]

ACCESSION #:  [id number]          ALT CASE #:   

SPECIMEN COLLECTION DATE:  3/27/2017

SPECIMEN RECEIVED DATE:  3/28/2017

   

[facility] - [address] [place] [address] - PHONE: 

[phone]

* * *  END OF REPORT  * * *

## 2020-03-31 ENCOUNTER — HOSPITAL ENCOUNTER (EMERGENCY)
Dept: HOSPITAL 61 - ER | Age: 40
Discharge: HOME | End: 2020-03-31
Payer: SELF-PAY

## 2020-03-31 VITALS — DIASTOLIC BLOOD PRESSURE: 67 MMHG | SYSTOLIC BLOOD PRESSURE: 137 MMHG

## 2020-03-31 VITALS — WEIGHT: 196.21 LBS | HEIGHT: 72 IN | BODY MASS INDEX: 26.58 KG/M2

## 2020-03-31 DIAGNOSIS — Z88.2: ICD-10-CM

## 2020-03-31 DIAGNOSIS — F41.9: ICD-10-CM

## 2020-03-31 DIAGNOSIS — R11.2: ICD-10-CM

## 2020-03-31 DIAGNOSIS — Z88.6: ICD-10-CM

## 2020-03-31 DIAGNOSIS — Z90.89: ICD-10-CM

## 2020-03-31 DIAGNOSIS — E11.40: ICD-10-CM

## 2020-03-31 DIAGNOSIS — M79.671: ICD-10-CM

## 2020-03-31 DIAGNOSIS — E11.65: ICD-10-CM

## 2020-03-31 DIAGNOSIS — Z88.5: ICD-10-CM

## 2020-03-31 DIAGNOSIS — Z98.890: ICD-10-CM

## 2020-03-31 DIAGNOSIS — R19.7: ICD-10-CM

## 2020-03-31 DIAGNOSIS — F32.9: ICD-10-CM

## 2020-03-31 DIAGNOSIS — R10.84: Primary | ICD-10-CM

## 2020-03-31 LAB
ALBUMIN SERPL-MCNC: 3.2 G/DL (ref 3.4–5)
ALBUMIN/GLOB SERPL: 0.6 {RATIO} (ref 1–1.7)
ALP SERPL-CCNC: 118 U/L (ref 46–116)
ALT SERPL-CCNC: 17 U/L (ref 16–63)
ANION GAP SERPL CALC-SCNC: 8 MMOL/L (ref 6–14)
AST SERPL-CCNC: 13 U/L (ref 15–37)
BASOPHILS # BLD AUTO: 0 X10^3/UL (ref 0–0.2)
BASOPHILS NFR BLD: 1 % (ref 0–3)
BILIRUB SERPL-MCNC: 0.2 MG/DL (ref 0.2–1)
BUN SERPL-MCNC: 13 MG/DL (ref 8–26)
BUN/CREAT SERPL: 19 (ref 6–20)
CALCIUM SERPL-MCNC: 9.2 MG/DL (ref 8.5–10.1)
CHLORIDE SERPL-SCNC: 100 MMOL/L (ref 98–107)
CO2 SERPL-SCNC: 27 MMOL/L (ref 21–32)
CREAT SERPL-MCNC: 0.7 MG/DL (ref 0.7–1.3)
EOSINOPHIL NFR BLD: 0 % (ref 0–3)
EOSINOPHIL NFR BLD: 0 X10^3/UL (ref 0–0.7)
ERYTHROCYTE [DISTWIDTH] IN BLOOD BY AUTOMATED COUNT: 17.4 % (ref 11.5–14.5)
GFR SERPLBLD BASED ON 1.73 SQ M-ARVRAT: 126.2 ML/MIN
GLOBULIN SER-MCNC: 5.2 G/DL (ref 2.2–3.8)
GLUCOSE SERPL-MCNC: 200 MG/DL (ref 70–99)
HCT VFR BLD CALC: 30.5 % (ref 39–53)
HGB BLD-MCNC: 10 G/DL (ref 13–17.5)
LIPASE: 158 U/L (ref 73–393)
LYMPHOCYTES # BLD: 2 X10^3/UL (ref 1–4.8)
LYMPHOCYTES NFR BLD AUTO: 33 % (ref 24–48)
MCH RBC QN AUTO: 26 PG (ref 25–35)
MCHC RBC AUTO-ENTMCNC: 33 G/DL (ref 31–37)
MCV RBC AUTO: 80 FL (ref 79–100)
MONO #: 0.4 X10^3/UL (ref 0–1.1)
MONOCYTES NFR BLD: 7 % (ref 0–9)
NEUT #: 3.6 X10^3/UL (ref 1.8–7.7)
NEUTROPHILS NFR BLD AUTO: 59 % (ref 31–73)
PLATELET # BLD AUTO: 254 X10^3/UL (ref 140–400)
POTASSIUM SERPL-SCNC: 3.9 MMOL/L (ref 3.5–5.1)
PROT SERPL-MCNC: 8.4 G/DL (ref 6.4–8.2)
RBC # BLD AUTO: 3.79 X10^6/UL (ref 4.3–5.7)
SODIUM SERPL-SCNC: 135 MMOL/L (ref 136–145)
WBC # BLD AUTO: 6.1 X10^3/UL (ref 4–11)

## 2020-03-31 PROCEDURE — 96376 TX/PRO/DX INJ SAME DRUG ADON: CPT

## 2020-03-31 PROCEDURE — 96374 THER/PROPH/DIAG INJ IV PUSH: CPT

## 2020-03-31 PROCEDURE — 96361 HYDRATE IV INFUSION ADD-ON: CPT

## 2020-03-31 PROCEDURE — 96375 TX/PRO/DX INJ NEW DRUG ADDON: CPT

## 2020-03-31 PROCEDURE — 73590 X-RAY EXAM OF LOWER LEG: CPT

## 2020-03-31 PROCEDURE — 85025 COMPLETE CBC W/AUTO DIFF WBC: CPT

## 2020-03-31 PROCEDURE — 84145 PROCALCITONIN (PCT): CPT

## 2020-03-31 PROCEDURE — 99285 EMERGENCY DEPT VISIT HI MDM: CPT

## 2020-03-31 PROCEDURE — 83690 ASSAY OF LIPASE: CPT

## 2020-03-31 PROCEDURE — 80053 COMPREHEN METABOLIC PANEL: CPT

## 2020-03-31 PROCEDURE — 36415 COLL VENOUS BLD VENIPUNCTURE: CPT

## 2020-03-31 RX ADMIN — MORPHINE SULFATE PRN MG: 4 INJECTION, SOLUTION INTRAMUSCULAR; INTRAVENOUS at 17:10

## 2020-03-31 RX ADMIN — MORPHINE SULFATE PRN MG: 4 INJECTION, SOLUTION INTRAMUSCULAR; INTRAVENOUS at 17:52

## 2020-03-31 NOTE — PHYS DOC
Past Medical History


Past Medical History:  Anxiety, Bipolar, Depression, Diabetes-Type II, 

Diverticulosis, Pancreatitis


Additional Past Medical Histor:  CHRON'S DISEASE,ADHD,OSTEOMYELITIS


 (ROSE MARIE BARROS)


Past Surgical History:  Other


Additional Past Surgical Histo:  Colonoscopy,EGD;abdominal abscess with surgical

drain,L BKA


 (ROSE MARIE BARROS)


Smoking Status:  Never Smoker


Additional Information:  


CHEWS TOBBACO


Alcohol Use:  Occasionally


Drug Use:  None, Methamphetamine


 (ROSE MARIE BARROS)


Attending Signature


I have participated in the care of this patient and I have reviewed and agree 

with all pertinent clinical information above including history, exam, and 

recommendations.





 (ALDAIR NELSON MD)





Adult General


Chief Complaint


Chief Complaint:  GI PROBLEM





HPI


HPI





Patient is a 39  year old male with history of diabetes type 2, bipolar, 

anxiety, diverticulosis, pancreatitis, who presents to the ED today complaining 

of 10 out of 10 generalized abdominal pain with nausea vomiting and diarrhea for

2 days.  Patient is also complaining of pain on the left stump, he reports he 

had his left foot amputated a month ago in [place].  He is requesting somethin

g for pain for this.  Denies any fever.  Reports the wife is a nurse who is been

doing dressing changes.  Denies any fever.  Denies any alcohol use.


 (ROSE MARIE BARROS)





Review of Systems


Review of Systems





Constitutional: Denies fever or chills []


Eyes: Denies change in visual acuity, redness, or eye pain []


HENT: Denies nasal congestion or sore throat []


Respiratory: Denies cough or shortness of breath []


Cardiovascular: No additional information not addressed in HPI []


GI: Reports abdominal pain, nausea vomiting and diarrhea, denies any hematemesis

 or melena


: Denies dysuria or hematuria []


Musculoskeletal: Reports left stump pain, denies back pain


Integument: Denies rash or skin lesions []


Neurologic: Denies headache, focal weakness or sensory changes []








All other systems were reviewed and found to be within normal limits, except as 

documented in this note.


 (ROSE MARIE BARROS)





Current Medications


Current Medications





Current Medications








 Medications


  (Trade)  Dose


 Ordered  Sig/Eden  Start Time


 Stop Time Status Last Admin


Dose Admin


 


 Morphine Sulfate


  (Morphine


 Sulfate)  4 mg  PRN Q15MIN  PRN  3/31/20 16:15


 3/31/20 19:10 DC 3/31/20 17:52


4 MG


 


 Ondansetron HCl


  (Zofran)  4 mg  1X  ONCE  3/31/20 16:15


 3/31/20 16:16 DC 3/31/20 17:09


4 MG


 


 Sodium Chloride  1,000 ml @ 


 1,000 mls/hr  1X  ONCE  3/31/20 16:15


 3/31/20 17:14 DC 3/31/20 17:09


1,000 MLS/HR





 (ALDAIR NELSON MD)





Allergies


Allergies





Allergies








Coded Allergies Type Severity Reaction Last Updated Verified


 


  fentanyl Allergy Severe Throat Swells--Anaphylaxis 3/28/17 Yes


 


  haloperidol Allergy Severe "In ICU, almost .Toxicity." 3/28/17 Yes


 


  lithium Allergy Severe "In ICU-almost . Toxicity" 3/28/17 Yes


 


  ketorolac Allergy Intermediate Hives 3/28/17 Yes


 


  dicyclomine Allergy Unknown  18 Yes


 


  divalproex sodium Allergy Unknown  18 Yes





 (ALDAIR NELSON MD)





Physical Exam


Physical Exam





Constitutional: Well developed, well nourished, no acute distress, non-toxic 

appearance. []


HENT: Normocephalic, atraumatic, bilateral external ears normal, oropharynx 

moist, no oral exudates, nose normal. []


Eyes: PERRLA, EOMI, conjunctiva normal, no discharge. [] 


Neck: Normal range of motion, no tenderness, supple, no stridor. [] 


Cardiovascular:Heart rate regular rhythm, no murmur []


Lungs & Thorax:  Bilateral breath sounds clear to auscultation []


Abdomen: Bowel sounds normal, soft, slight tenderness on palpation of the left 

side of the abdomen, no tenderness in the right lower quadrant, no masses, no 

pulsatile masses. [] 


Skin: Warm, dry, no erythema, no rash. [] 


Back: No tenderness, no CVA tenderness. [] 


Extremities: Left lower extremity with a new below the knee amputation, sutures 

are still present on the stump site.  The stump site is well approximated.  

There is trace serosanguineous drainage from the stump.  Full range of motion to

 the left lower extremity.


Neurologic: Alert and oriented X 3, normal motor function, normal sensory 

function, no focal deficits noted. []


Psychologic: Affect normal, judgement normal, mood normal. []


 (GÉNESISJAYANTROSE MARIE GRAJEDA)





Current Patient Data


Vital Signs





                                   Vital Signs








  Date Time  Temp Pulse Resp B/P (MAP) Pulse Ox O2 Delivery O2 Flow Rate FiO2


 


3/31/20 18:37  104 16 137/67 (90) 98 Room Air  


 


3/31/20 15:38 98.5       





 98.5       





 (ALDAIR NELSON MD)


Lab Values





                                Laboratory Tests








Test


 3/31/20


16:55


 


White Blood Count


 6.1 x10^3/uL


(4.0-11.0)


 


Red Blood Count


 3.79 x10^6/uL


(4.30-5.70)  L


 


Hemoglobin


 10.0 g/dL


(13.0-17.5)  L


 


Hematocrit


 30.5 %


(39.0-53.0)  L


 


Mean Corpuscular Volume


 80 fL ()





 


Mean Corpuscular Hemoglobin 26 pg (25-35)  


 


Mean Corpuscular Hemoglobin


Concent 33 g/dL


(31-37)


 


Red Cell Distribution Width


 17.4 %


(11.5-14.5)  H


 


Platelet Count


 254 x10^3/uL


(140-400)


 


Neutrophils (%) (Auto) 59 % (31-73)  


 


Lymphocytes (%) (Auto) 33 % (24-48)  


 


Monocytes (%) (Auto) 7 % (0-9)  


 


Eosinophils (%) (Auto) 0 % (0-3)  


 


Basophils (%) (Auto) 1 % (0-3)  


 


Neutrophils # (Auto)


 3.6 x10^3/uL


(1.8-7.7)


 


Lymphocytes # (Auto)


 2.0 x10^3/uL


(1.0-4.8)


 


Monocytes # (Auto)


 0.4 x10^3/uL


(0.0-1.1)


 


Eosinophils # (Auto)


 0.0 x10^3/uL


(0.0-0.7)


 


Basophils # (Auto)


 0.0 x10^3/uL


(0.0-0.2)


 


Sodium Level


 135 mmol/L


(136-145)  L


 


Potassium Level


 3.9 mmol/L


(3.5-5.1)


 


Chloride Level


 100 mmol/L


()


 


Carbon Dioxide Level


 27 mmol/L


(21-32)


 


Anion Gap 8 (6-14)  


 


Blood Urea Nitrogen


 13 mg/dL


(8-26)


 


Creatinine


 0.7 mg/dL


(0.7-1.3)


 


Estimated GFR


(Cockcroft-Gault) 126.2  





 


BUN/Creatinine Ratio 19 (6-20)  


 


Glucose Level


 200 mg/dL


(70-99)  H


 


Calcium Level


 9.2 mg/dL


(8.5-10.1)


 


Total Bilirubin


 0.2 mg/dL


(0.2-1.0)


 


Aspartate Amino Transferase


(AST) 13 U/L (15-37)


L


 


Alanine Aminotransferase (ALT)


 17 U/L (16-63)





 


Alkaline Phosphatase


 118 U/L


()  H


 


Total Protein


 8.4 g/dL


(6.4-8.2)  H


 


Albumin


 3.2 g/dL


(3.4-5.0)  L


 


Albumin/Globulin Ratio


 0.6 (1.0-1.7)


L


 


Lipase


 158 U/L


()


 


Procalcitonin


 < 0.10 ng/mL


(0.00-0.10)


 


Ethyl Alcohol Level


 < 10 mg/dL


(0-10)





                                Laboratory Tests


3/31/20 16:55








                                Laboratory Tests


3/31/20 16:55








 (ALDAIR NELSON MD)





EKG


EKG


[]


 (ROSE MARIE BARROS)





Radiology/Procedures


Radiology/Procedures


[]PROCEDURE: TIBIA FIBULA LEFT





Left tibia-fibula 2 views


 


INDICATION: Left leg pain after fall.


 


FINDINGS:


 


Surgical changes from a below the knee amputation are evident with sharp 


surgical margins and evidence of heterotopic ossification around the 


distal tibial diaphysis.


 


No acute fracture. No traumatic malalignment.


 


The soft tissues of the remaining left lower extremity are unremarkable 


post amputation.


 


IMPRESSION:


Unremarkable appearance to the left below the knee amputation. 


No fracture, malalignment or aggressive appearing osseous lesions.


 


Electronically signed by: Mauricio Mooney MD (3/31/2020 4:30 PM) 


[id number]














DICTATED and SIGNED BY:     MAURICIO MOONEY MD


DATE:     20 1630


 (ROSE MARIE BARROS)





Course & Med Decision Making


Course & Med Decision Making


Pertinent Labs and Imaging studies reviewed. (See chart for details)





This is a 39-year-old male patient well-known to this ED presenting today 

complaining of generalized abdominal pain with nausea vomiting and diarrhea that

 began yesterday.  Patient also reports having left below the knee amputation 

done approximately a month ago in [place].  He is requesting pain medicine for

 this.  Recommended he contacts his doctor in [place] and they can figure out 

how he can get pain medicine otherwise we will give him prescription for 

gabapentin.  Patient's labs are negative for any acute findings, we did not do 

any CAT scan of the abdomen and pelvis on this patient because he has had 

multiple CTs in the last 1 year.  Patient refused to give us any urine.  

Discharge to home.  Provided GI for follow-up.








 (ROSE MARIE BARROS)





Dragon Disclaimer


Dragon Disclaimer


This electronic medical record was generated, in whole or in part, using a voice

 recognition dictation system.


 (ROSE MARIE BARROS)





Departure


Departure


Impression:  


   Primary Impression:  


   Abdominal pain


   Additional Impressions:  


   Hyperglycemia


   Neuropathic pain


   Nausea and vomiting


   Diarrhea


Disposition:   HOME, SELF-CARE


Condition:  STABLE


Referrals:  


NO PCP (PCP)








PAULA CARRERO MD


follow up in 1-2 weeks


Patient Instructions:  Abdominal Pain





Additional Instructions:  


You were evaluated in the emergency room for abdominal pain, nausea vomiting and

 diarrhea as well as pain on your left lower extremity.  We highly recommend you

 follow-up with your own doctor as soon as you can.  Take the prescribed 

medications as ordered.  Come back to the ED at any point symptoms worsen.


Scripts


Ondansetron (ONDANSETRON ODT) 4 Mg Tab.rapdis


1 TAB PO PRN Q6-8HRS, #16 TAB


   Prov: GÉNESISRAFIQROSE MARIE         3/31/20 


Gabapentin (GABAPENTIN **) 300 Mg Capsule


300 MG PO TID for NEUROGENIC PAIN, #30 CAP


   Prov: ROSE MARIE BARROS         3/31/20





Problem Qualifiers








   Primary Impression:  


   Abdominal pain


   Abdominal location:  generalized  Qualified Codes:  R10.84 - Generalized 

   abdominal pain


   Additional Impressions:  


   Nausea and vomiting


   Vomiting type:  unspecified  Vomiting Intractability:  non-intractable  

   Qualified Codes:  R11.2 - Nausea with vomiting, unspecified


   Diarrhea


   Diarrhea type:  unspecified type  Qualified Codes:  R19.7 - Diarrhea, unspeci

   fied








FIORELLAROSE MARIE              Mar 31, 2020 18:31


ALDAIR NELSON MD              Mar 31, 2020 19:48

## 2020-03-31 NOTE — RAD
Left tibia-fibula 2 views

 

INDICATION: Left leg pain after fall.

 

FINDINGS:

 

Surgical changes from a below the knee amputation are evident with sharp 

surgical margins and evidence of heterotopic ossification around the 

distal tibial diaphysis.

 

No acute fracture. No traumatic malalignment.

 

The soft tissues of the remaining left lower extremity are unremarkable 

post amputation.

 

IMPRESSION:

Unremarkable appearance to the left below the knee amputation. 

No fracture, malalignment or aggressive appearing osseous lesions.

 

Electronically signed by: Mimi Mooney MD (3/31/2020 4:30 PM) 

[id number]